# Patient Record
Sex: FEMALE | Race: WHITE | Employment: FULL TIME | URBAN - METROPOLITAN AREA
[De-identification: names, ages, dates, MRNs, and addresses within clinical notes are randomized per-mention and may not be internally consistent; named-entity substitution may affect disease eponyms.]

---

## 2017-01-19 RX ORDER — ESCITALOPRAM OXALATE 20 MG/1
TABLET ORAL
Qty: 30 TAB | Refills: 1 | Status: SHIPPED | OUTPATIENT
Start: 2017-01-19 | End: 2017-02-22 | Stop reason: SDUPTHER

## 2017-02-22 ENCOUNTER — OFFICE VISIT (OUTPATIENT)
Dept: INTERNAL MEDICINE CLINIC | Age: 62
End: 2017-02-22

## 2017-02-22 VITALS
HEART RATE: 66 BPM | DIASTOLIC BLOOD PRESSURE: 80 MMHG | SYSTOLIC BLOOD PRESSURE: 112 MMHG | OXYGEN SATURATION: 98 % | RESPIRATION RATE: 14 BRPM | HEIGHT: 70 IN | TEMPERATURE: 98.6 F

## 2017-02-22 DIAGNOSIS — Z23 NEED FOR SHINGLES VACCINE: ICD-10-CM

## 2017-02-22 DIAGNOSIS — F32.A DEPRESSION, UNSPECIFIED DEPRESSION TYPE: ICD-10-CM

## 2017-02-22 DIAGNOSIS — Z00.00 ROUTINE GENERAL MEDICAL EXAMINATION AT A HEALTH CARE FACILITY: Primary | ICD-10-CM

## 2017-02-22 DIAGNOSIS — Z11.59 NEED FOR HEPATITIS C SCREENING TEST: ICD-10-CM

## 2017-02-22 DIAGNOSIS — Z23 NEED FOR TDAP VACCINATION: ICD-10-CM

## 2017-02-22 RX ORDER — ESCITALOPRAM OXALATE 20 MG/1
TABLET ORAL
Qty: 30 TAB | Refills: 11 | Status: SHIPPED | OUTPATIENT
Start: 2017-02-22 | End: 2018-03-29 | Stop reason: SDUPTHER

## 2017-02-22 RX ORDER — ALPRAZOLAM 0.5 MG/1
0.5 TABLET ORAL
Qty: 30 TAB | Refills: 1 | Status: SHIPPED | OUTPATIENT
Start: 2017-02-22

## 2017-02-22 RX ORDER — FLUTICASONE FUROATE AND VILANTEROL 200; 25 UG/1; UG/1
1 POWDER RESPIRATORY (INHALATION) DAILY
Qty: 1 INHALER | Refills: 0 | Status: SHIPPED | COMMUNITY
Start: 2017-02-22 | End: 2017-03-22 | Stop reason: SDUPTHER

## 2017-02-22 RX ORDER — ALBUTEROL SULFATE 90 UG/1
2 AEROSOL, METERED RESPIRATORY (INHALATION)
Qty: 1 INHALER | Refills: 11 | Status: SHIPPED | OUTPATIENT
Start: 2017-02-22 | End: 2017-11-06 | Stop reason: SDUPTHER

## 2017-02-22 NOTE — PROGRESS NOTES
Chief Complaint   Patient presents with    Complete Physical     Goals that were addressed/or need to be completed after this visit:  Health Maintenance Due   Topic    Hepatitis C Screening     DTaP/Tdap/Td series (1 - Tdap)    PAP AKA CERVICAL CYTOLOGY     ZOSTER VACCINE AGE 60>     COLONOSCOPY      Faxed request to Dr. Kunal metzger office requesting patients most recent pap report to be faxed to our office. Fax confirmation received. Faxed request to Dr. Stalin Ambrose office requesting patients most recent colonoscopy to be faxed to our office. Fax confirmation received. Patient denies any symptoms, reactions or allergies that would exclude them from being immunized today. Risks and adverse reactions were discussed and the VIS was given to them. All questions were addressed. TDAP - Boostrix Injection  Left Arm  0.5 mL  Lot# [id number]  Exp: 5/20/2019  [employer]  NDC - 80803-136-56  Pt tolerated well. The patient was observed for 15 min post injection. There were no reactions observed.

## 2017-02-22 NOTE — MR AVS SNAPSHOT
Visit Information Date & Time Provider Department Dept. Phone Encounter #  
 2/22/2017  4:00 PM Whitney Padron MD [facility] [phone] [id number] Upcoming Health Maintenance Date Due Hepatitis C Screening 1955 DTaP/Tdap/Td series (1 - Tdap) 2/11/1976 PAP AKA CERVICAL CYTOLOGY 2/11/1976 ZOSTER VACCINE AGE 60> 2/11/2015 COLONOSCOPY 1/16/2016 BREAST CANCER SCRN MAMMOGRAM 4/7/2018 Allergies as of 2/22/2017  Review Complete On: 2/22/2017 By: Whitney Padron MD  
 No Known Allergies Current Immunizations  Reviewed on 2/22/2017 Name Date Influenza Vaccine 10/30/2016 Tdap  Incomplete Reviewed by Whitney Padron MD on 2/22/2017 at  4:39 PM  
 Reviewed by Whitney Padron MD on 2/22/2017 at  4:40 PM  
You Were Diagnosed With   
  
 Codes Comments Routine general medical examination at a health care facility    -  Primary ICD-10-CM: Z00.00 ICD-9-CM: V70.0 Need for shingles vaccine     ICD-10-CM: H72 ICD-9-CM: V04.89 Need for hepatitis C screening test     ICD-10-CM: Z11.59 
ICD-9-CM: V73.89 Need for Tdap vaccination     ICD-10-CM: W24 ICD-9-CM: V06.1 Depression, unspecified depression type     ICD-10-CM: F32.9 ICD-9-CM: 436 Vitals BP  
  
  
  
  
  
 [address] Preferred Pharmacy Pharmacy Name Phone [id number] [place]/ [address] [facility] [phone] Your Updated Medication List  
  
   
This list is accurate as of: 2/22/17  4:55 PM.  Always use your most recent med list.  
  
  
  
  
 albuterol 90 mcg/actuation inhaler Commonly known as:  PROAIR HFA Take 2 Puffs by inhalation every six (6) hours as needed for Wheezing. Indications: Acute Asthma Attack ALPRAZolam 0.5 mg tablet Commonly known as:  Marti Josephine Take 1 Tab by mouth three (3) times daily as needed for Sleep or Anxiety. Indications: ANXIETY aspirin delayed-release 81 mg tablet Take  by mouth daily. cyclobenzaprine 10 mg tablet Commonly known as:  FLEXERIL Take 0.5-1 Tabs by mouth three (3) times daily as needed for Muscle Spasm(s). ergocalciferol 50,000 unit capsule Commonly known as:  ERGOCALCIFEROL Use weekly times one month then every 2 weeks for 6 months.  
  
 escitalopram oxalate 20 mg tablet Commonly known as:  Pollyann Essex TAKE ONE TABLET BY MOUTH EVERY DAY LAB WORK REQUIRED FOR ADDITIONAL REFILLS  Indications: ANXIETY WITH DEPRESSION  
  
 fexofenadine 180 mg tablet Commonly known as:  Melanie Peat Take 1 Tab by mouth daily. fluticasone-vilanterol 200-25 mcg/dose inhaler Commonly known as:  BREO ELLIPTA Take 1 Puff by inhalation daily. folic acid 1 mg tablet Commonly known as:  Google Take 4 mg by mouth daily. furosemide 40 mg tablet Commonly known as:  LASIX Take 1 Tab by mouth daily as needed. gabapentin 100 mg capsule Commonly known as:  NEURONTIN  
TAKE ONE (1) CAPSULE(S) THREE TIMESDAILY AS NEEDED. Iron 325 mg (65 mg iron) tablet Generic drug:  ferrous sulfate Take 1 Tab by mouth Daily (before breakfast). leucovorin calcium 5 mg tablet Commonly known as:  Samreen Nares Take  by mouth every seven (7) days. levothyroxine 125 mcg tablet Commonly known as:  SYNTHROID  
TAKE ONE TABLET BY MOUTH EVERY DAY BEFORE BREAKFAST - LAB WORK & FOLLOW UP APPOINTMENT REQUIRED FOR ADDITIONAL REFILLS  
  
 methotrexate 2.5 mg tablet Commonly known as:  Samantha Bye Take 8 Tabs by mouth every Sunday. PROBIOTIC COLON SUPPORT 1.5 billion cell Cap Generic drug:  L. gasseri-B. bifidum-B longum Take 1 Cap by mouth daily. REMICADE 100 mg injection Generic drug:  inFLIXimab  
5 mg/kg by IntraVENous route once. [id number]  
  
 traMADol 50 mg tablet Commonly known as:  ULTRAM  
50 mg nightly as needed. Prescriptions Printed Refills ALPRAZolam (XANAX) 0.5 mg tablet 1 Sig: Take 1 Tab by mouth three (3) times daily as needed for Sleep or Anxiety. Indications: ANXIETY Class: Print Route: Oral  
  
Prescriptions Sent to Pharmacy Refills  
 escitalopram oxalate (LEXAPRO) 20 mg tablet 11 Sig: TAKE ONE TABLET BY MOUTH EVERY DAY LAB WORK REQUIRED FOR ADDITIONAL REFILLS  Indications: ANXIETY WITH DEPRESSION Class: Normal  
 [facility] Ph #: [phone]  
 albuterol (PROAIR HFA) 90 mcg/actuation inhaler 11 Sig: Take 2 Puffs by inhalation every six (6) hours as needed for Wheezing. Indications: Acute Asthma Attack Class: Normal  
 Pharmacy: [id number] [address] Ph #: [phone] Route: Inhalation We Performed the Following HEPATITIS C AB [80538 CPT(R)] LIPID PANEL [43225 CPT(R)] SC IMMUNIZ ADMIN,1 SINGLE/COMB VAC/TOXOID [id number] CPT(R)] T4, FREE N853100 CPT(R)] TETANUS, DIPHTHERIA TOXOIDS AND ACELLULAR PERTUSSIS VACCINE (TDAP), IN INDIVIDS. >=7, IM [id number] CPT(R)] TSH 3RD GENERATION [31588 CPT(R)] UA/M W/RFLX CULTURE, ROUTINE [[id number] Custom] Introducing [facility]! Dear Yamile Harvey: Thank you for requesting a [employer] account. Our records indicate that you already have an active [employer] account. You can access your account anytime at [email or web address] Did you know that you can access your hospital and ER discharge instructions at any time in [employer]? You can also review all of your test results from your hospital stay or ER visit. Additional Information If you have questions, please visit the Frequently Asked Questions section of the [employer] website at [email or web address] Remember, [employer] is NOT to be used for urgent needs. For medical emergencies, dial 911. Now available from your iPhone and Android! Please provide this summary of care documentation to your next provider. Your primary care clinician is listed as [phone] If you have any questions after today's visit, please call [phone].

## 2017-02-23 NOTE — PROGRESS NOTES
Subjective:   58 y.o. female for Well Woman Check. Her gyne and breast care is done elsewhere by her Ob-Gyne physician. Patient Active Problem List    Diagnosis Date Noted    Depression 02/22/2017    Asthma     Left atrial enlargement     Meniere's disease     Pulmonary embolism (HCC)     RA (rheumatoid arthritis) (HCC)     Thyroid disease     Vitamin D deficiency     Anxiety state 06/23/2010     Current Outpatient Prescriptions   Medication Sig Dispense Refill    escitalopram oxalate (LEXAPRO) 20 mg tablet TAKE ONE TABLET BY MOUTH EVERY DAY LAB WORK REQUIRED FOR ADDITIONAL REFILLS  Indications: ANXIETY WITH DEPRESSION 30 Tab 11    ALPRAZolam (XANAX) 0.5 mg tablet Take 1 Tab by mouth three (3) times daily as needed for Sleep or Anxiety. Indications: ANXIETY 30 Tab 1    albuterol (PROAIR HFA) 90 mcg/actuation inhaler Take 2 Puffs by inhalation every six (6) hours as needed for Wheezing. Indications: Acute Asthma Attack 1 Inhaler 11    fluticasone-vilanterol (BREO ELLIPTA) 200-25 mcg/dose inhaler Take 1 Puff by inhalation daily. 1 Inhaler 0    furosemide (LASIX) 40 mg tablet Take 1 Tab by mouth daily as needed. (Patient taking differently: Take 40 mg by mouth daily.) 90 Tab 0    levothyroxine (SYNTHROID) 125 mcg tablet TAKE ONE TABLET BY MOUTH EVERY DAY BEFORE BREAKFAST - LAB WORK & FOLLOW UP APPOINTMENT REQUIRED FOR ADDITIONAL REFILLS 90 Tab 1    traMADol (ULTRAM) 50 mg tablet 50 mg nightly as needed.  aspirin delayed-release 81 mg tablet Take  by mouth daily.  leucovorin calcium (WELLCOVORIN) 5 mg tablet Take  by mouth every seven (7) days.  fexofenadine (ALLEGRA) 180 mg tablet Take 1 Tab by mouth daily.  methotrexate (RHEUMATREX) 2.5 mg tablet Take 8 Tabs by mouth every Sunday.  infliximab (REMICADE) 100 mg injection 5 mg/kg by IntraVENous route once. [id number]      folic acid (FOLVITE) 1 mg tablet Take 4 mg by mouth daily.       gabapentin (NEURONTIN) 100 mg capsule TAKE ONE (1) CAPSULE(S) THREE TIMESDAILY AS NEEDED. 60 Cap 3    cyclobenzaprine (FLEXERIL) 10 mg tablet Take 0.5-1 Tabs by mouth three (3) times daily as needed for Muscle Spasm(s). 30 Tab 0    Lacto gasseri-B bifid-B longum (PROBIOTIC COLON SUPPORT) 1.5 billion cell cap Take 1 Cap by mouth daily.  ergocalciferol (ERGOCALCIFEROL) 50,000 unit capsule Use weekly times one month then every 2 weeks for 6 months. 14 capsule 1    ferrous sulfate (IRON) 325 mg (65 mg iron) tablet Take 1 Tab by mouth Daily (before breakfast). No Known Allergies  Past Medical History:   Diagnosis Date    Anxiety state, unspecified 6/23/2010    Asthma     Left atrial enlargement     RIGHT ATR. ENL.     Meniere's disease     Pulmonary embolism ([facility] [address] '98    MULTIPLE    RA (rheumatoid arthritis) ([facility] [address]     Thyroid disease     Unspecified asthma(493.90) 6/23/2010    Vitamin D deficiency      Past Surgical History:   Procedure Laterality Date    ABDOMEN SURGERY PROC UNLISTED      ENDOMETRIAL CRYOABLATION  1996    GASTRIC BYPASS,OBESE<150CM TONIA-EN-Y      HX BREAST LUMPECTOMY  '98    BENIGN    HX CERVICAL LAMINECTOMY  07/02/2015    C5-C6    HX KNEE ARTHROSCOPY      HX TONSILLECTOMY      REMOVAL OF COCCYX       Family History   Problem Relation Age of Onset    Diabetes Mother     Stroke Mother      TIA    Heart Disease Father     Diabetes Father      Social History   Substance Use Topics    Smoking status: Never Smoker    Smokeless tobacco: Never Used    Alcohol use No        Lab Results  Component Value Date/Time   Cholesterol, total 169 12/26/2014 11:04 AM   HDL Cholesterol 71 12/26/2014 11:04 AM   LDL, calculated 89 12/26/2014 11:04 AM   Triglyceride 47 12/26/2014 11:04 AM       Lab Results  Component Value Date/Time   TSH 3.640 12/26/2014 11:04 AM   T4, Free 0.92 12/26/2014 11:04 AM      Lab Results   Component Value Date/Time    Glucose 100 12/08/2011 11:05 AM         Specific concerns today: URi symptoms with dry cough and wheeze for one week. No fever or chills. NO fever or chills. No PND or orthopnea. No change in bowels or bladder. Is seeing the rheum MD regularly and labs done every 3 months . Arthritis is well controlled. .    Review of Systems  A comprehensive review of systems was negative except for that written in the HPI. Objective:   Blood pressure 112/80, pulse 66, temperature 98.6 °F (37 °C), temperature source Oral, resp. rate 14, height 5' 9.5\" (1.765 m), SpO2 98 %.    Physical Examination:   General appearance - alert, well appearing, and in no distress  Eyes - pupils equal and reactive, extraocular eye movements intact  Ears - bilateral TM's and external ear canals normal  Nose - normal and patent, no erythema, discharge or polyps  Mouth - mucous membranes moist, pharynx normal without lesions  Neck - supple, no significant adenopathy  Lymphatics - no palpable lymphadenopathy, no hepatosplenomegaly  Chest - clear to auscultation, no wheezes, rales or rhonchi, symmetric air entry  Heart - normal rate, regular rhythm, normal S1, S2, no murmurs, rubs, clicks or gallops  Abdomen - soft, nontender, nondistended, no masses or organomegaly  Back exam - full range of motion, no tenderness, palpable spasm or pain on motion  Neurological - alert, oriented, normal speech, no focal findings or movement disorder noted  Musculoskeletal - no joint tenderness, deformity or swelling  Extremities - peripheral pulses normal, no pedal edema, no clubbing or cyanosis     Assessment/Plan:     lose weight, increase physical activity, routine labs ordered  Harrison Rodriges was seen today for complete physical.    Diagnoses and all orders for this visit:    Routine general medical examination at a health care facility  -     LIPID PANEL  -     HEPATITIS C AB  -     T4, FREE  -     TSH 3RD GENERATION  -     UA/M W/RFLX CULTURE, ROUTINE    Need for shingles vaccine    Need for hepatitis C screening test    Need for Tdap vaccination  -     TETANUS, DIPHTHERIA TOXOIDS AND ACELLULAR PERTUSSIS VACCINE (TDAP), IN INDIVIDS. >=7, IM  -     SD IMMUNIZ ADMIN,1 SINGLE/COMB VAC/TOXOID    Depression, unspecified depression type - controlled on current meds. Other orders    -     escitalopram oxalate (LEXAPRO) 20 mg tablet; TAKE ONE TABLET BY MOUTH EVERY DAY LAB WORK REQUIRED FOR ADDITIONAL REFILLS  Indications: ANXIETY WITH DEPRESSION  -     ALPRAZolam (XANAX) 0.5 mg tablet; Take 1 Tab by mouth three (3) times daily as needed for Sleep or Anxiety. Indications: ANXIETY  -     albuterol (PROAIR HFA) 90 mcg/actuation inhaler; Take 2 Puffs by inhalation every six (6) hours as needed for Wheezing. Indications: Acute Asthma Attack  -     fluticasone-vilanterol (BREO ELLIPTA) 200-25 mcg/dose inhaler; Take 1 Puff by inhalation daily. Mild asthma flair - will try inhaled steroids and she will call if not better    RA - controlled and seeing the rheum MD regularly. Follow-up Disposition: 12 months and as needed. Advised her to call back or return to office if symptoms worsen/change/persist.  Discussed expected course/resolution/complications of diagnosis in detail with patient. Medication risks/benefits/costs/interactions/alternatives discussed with patient. She was given an after visit summary which includes diagnoses, current medications, & vitals. She expressed understanding with the diagnosis and plan.

## 2017-03-03 RX ORDER — PREDNISONE 10 MG/1
TABLET ORAL
Qty: 30 TAB | Refills: 0 | Status: SHIPPED | OUTPATIENT
Start: 2017-03-03 | End: 2017-03-20 | Stop reason: ALTCHOICE

## 2017-03-07 LAB
CHOLEST SERPL-MCNC: 213 MG/DL (ref 100–199)
GLUCOSE UR QL: NORMAL
HCV AB S/CO SERPL IA: <0.1 S/CO RATIO (ref 0–0.9)
HDLC SERPL-MCNC: 93 MG/DL
KETONES UR QL STRIP: NORMAL
LDLC SERPL CALC-MCNC: 111 MG/DL (ref 0–99)
PH UR STRIP: NORMAL [PH]
PROT UR QL STRIP: NORMAL
SP GR UR: NORMAL
T4 FREE SERPL-MCNC: 0.95 NG/DL (ref 0.82–1.77)
TRIGL SERPL-MCNC: 45 MG/DL (ref 0–149)
TSH SERPL DL<=0.005 MIU/L-ACNC: 3.35 UIU/ML (ref 0.45–4.5)
VLDLC SERPL CALC-MCNC: 9 MG/DL (ref 5–40)

## 2017-03-20 ENCOUNTER — HOSPITAL ENCOUNTER (OUTPATIENT)
Dept: CT IMAGING | Age: 62
Discharge: HOME | End: 2017-03-20
Attending: INTERNAL MEDICINE
Payer: COMMERCIAL

## 2017-03-20 ENCOUNTER — OFFICE VISIT (OUTPATIENT)
Dept: INTERNAL MEDICINE CLINIC | Age: 62
End: 2017-03-20

## 2017-03-20 ENCOUNTER — TELEPHONE (OUTPATIENT)
Dept: INTERNAL MEDICINE CLINIC | Age: 62
End: 2017-03-20

## 2017-03-20 VITALS
SYSTOLIC BLOOD PRESSURE: 100 MMHG | RESPIRATION RATE: 20 BRPM | TEMPERATURE: 98.2 F | HEART RATE: 68 BPM | OXYGEN SATURATION: 98 % | DIASTOLIC BLOOD PRESSURE: 70 MMHG

## 2017-03-20 DIAGNOSIS — Z86.711 HX PULMONARY EMBOLISM: ICD-10-CM

## 2017-03-20 DIAGNOSIS — R06.02 SOB (SHORTNESS OF BREATH): ICD-10-CM

## 2017-03-20 DIAGNOSIS — R05.9 COUGH: ICD-10-CM

## 2017-03-20 DIAGNOSIS — R06.02 SOB (SHORTNESS OF BREATH): Primary | ICD-10-CM

## 2017-03-20 PROCEDURE — 71275 CT ANGIOGRAPHY CHEST: CPT

## 2017-03-20 PROCEDURE — 74011636320 HC RX REV CODE- 636/320: Performed by: INTERNAL MEDICINE

## 2017-03-20 RX ORDER — PREDNISONE 10 MG/1
10 TABLET ORAL
Qty: 30 TAB | Refills: 0 | Status: SHIPPED | OUTPATIENT
Start: 2017-03-20 | End: 2017-11-06 | Stop reason: ALTCHOICE

## 2017-03-20 RX ORDER — AMOXICILLIN AND CLAVULANATE POTASSIUM 875; 125 MG/1; MG/1
1 TABLET, FILM COATED ORAL EVERY 12 HOURS
Qty: 20 TAB | Refills: 0 | Status: SHIPPED | OUTPATIENT
Start: 2017-03-20 | End: 2017-03-30

## 2017-03-20 RX ADMIN — IOPAMIDOL 100 ML: 755 INJECTION, SOLUTION INTRAVENOUS at 11:49

## 2017-03-20 NOTE — PROGRESS NOTES
Subjective:   Manan Carranza is a 58 y.o. female who complains of congestion, dry cough. Wheeze and SOB as well and left sinus pain for 14 days, unchanged since that time. She denies a history of fevers, myalgias, nausea and vomiting. Evaluation to date: seen and treated with steroids and antibiotics. .   Treatment to date: as above. Patient does smoke cigarettes. Relevant PMH: Asthma. Patient Active Problem List    Diagnosis Date Noted    Depression 02/22/2017    Asthma     Left atrial enlargement     Meniere's disease     Pulmonary embolism (HCC)     RA (rheumatoid arthritis) (HCC)     Thyroid disease     Vitamin D deficiency     Anxiety state 06/23/2010     Current Outpatient Prescriptions   Medication Sig Dispense Refill    escitalopram oxalate (LEXAPRO) 20 mg tablet TAKE ONE TABLET BY MOUTH EVERY DAY LAB WORK REQUIRED FOR ADDITIONAL REFILLS  Indications: ANXIETY WITH DEPRESSION 30 Tab 11    ALPRAZolam (XANAX) 0.5 mg tablet Take 1 Tab by mouth three (3) times daily as needed for Sleep or Anxiety. Indications: ANXIETY 30 Tab 1    albuterol (PROAIR HFA) 90 mcg/actuation inhaler Take 2 Puffs by inhalation every six (6) hours as needed for Wheezing. Indications: Acute Asthma Attack 1 Inhaler 11    fluticasone-vilanterol (BREO ELLIPTA) 200-25 mcg/dose inhaler Take 1 Puff by inhalation daily. 1 Inhaler 0    furosemide (LASIX) 40 mg tablet Take 1 Tab by mouth daily as needed. (Patient taking differently: Take 40 mg by mouth daily.) 90 Tab 0    levothyroxine (SYNTHROID) 125 mcg tablet TAKE ONE TABLET BY MOUTH EVERY DAY BEFORE BREAKFAST - LAB WORK & FOLLOW UP APPOINTMENT REQUIRED FOR ADDITIONAL REFILLS 90 Tab 1    gabapentin (NEURONTIN) 100 mg capsule TAKE ONE (1) CAPSULE(S) THREE TIMESDAILY AS NEEDED. 60 Cap 3    cyclobenzaprine (FLEXERIL) 10 mg tablet Take 0.5-1 Tabs by mouth three (3) times daily as needed for Muscle Spasm(s).  30 Tab 0    traMADol (ULTRAM) 50 mg tablet 50 mg nightly as needed.  aspirin delayed-release 81 mg tablet Take  by mouth daily.  leucovorin calcium (WELLCOVORIN) 5 mg tablet Take  by mouth every seven (7) days.  fexofenadine (ALLEGRA) 180 mg tablet Take 1 Tab by mouth daily.  ferrous sulfate (IRON) 325 mg (65 mg iron) tablet Take 1 Tab by mouth Daily (before breakfast).  methotrexate (RHEUMATREX) 2.5 mg tablet Take 8 Tabs by mouth every Sunday.  infliximab (REMICADE) 100 mg injection 5 mg/kg by IntraVENous route once. [id number]      folic acid (FOLVITE) 1 mg tablet Take 4 mg by mouth daily.  Lacto gasseri-B bifid-B longum (PROBIOTIC COLON SUPPORT) 1.5 billion cell cap Take 1 Cap by mouth daily. No Known Allergies  Past Medical History:   Diagnosis Date    Anxiety state, unspecified 6/23/2010    Asthma     Left atrial enlargement     RIGHT ATR. ENL.  Meniere's disease     Pulmonary embolism ([facility] [address] '98    MULTIPLE    RA (rheumatoid arthritis) ([facility] [address]     Thyroid disease     Unspecified asthma(493.90) 6/23/2010    Vitamin D deficiency      Past Surgical History:   Procedure Laterality Date    ABDOMEN SURGERY PROC UNLISTED      ENDOMETRIAL CRYOABLATION  1996    GASTRIC BYPASS,OBESE<150CM TONIA-EN-Y      HX BREAST LUMPECTOMY  '98    BENIGN    HX CERVICAL LAMINECTOMY  07/02/2015    C5-C6    HX COLONOSCOPY  05/06/2016    Dr. Kiet James - 8 yr f/u    HX KNEE ARTHROSCOPY      HX TONSILLECTOMY      REMOVAL OF COCCYX       Family History   Problem Relation Age of Onset    Diabetes Mother     Stroke Mother      TIA    Heart Disease Father     Diabetes Father      Social History   Substance Use Topics    Smoking status: Never Smoker    Smokeless tobacco: Never Used    Alcohol use No        Review of Systems  Pertinent items are noted in HPI.     Objective:     Visit Vitals    /70 (BP 1 Location: Right arm, BP Patient Position: Sitting)    Pulse 68    Temp 98.2 °F (36.8 °C) (Oral)    Resp 20    SpO2 98%     General: alert, cooperative, no distress   Eyes: negative   Ears: normal TM's and external ear canals AU   Sinuses: tenderness over left maxillary   Mouth:  Lips, mucosa, and tongue normal. Teeth and gums normal   Neck: supple, symmetrical, trachea midline and no adenopathy. Heart: S1 and S2 normal, no murmurs noted. Lungs: rhonchi R base, L base   Abdomen: soft, non-tender. Bowel sounds normal. No masses,  no organomegaly        Assessment/Plan:   ? Pneumonia versus recurrent PE with sinusitis and asthma exacerbation  Plan -   Plan -   Orders Placed This Encounter    CTA CHEST W WO CONT   Oonsider antibiotics and more steroids if negative.

## 2017-03-20 NOTE — TELEPHONE ENCOUNTER
Spoke with pt in ref to CTA results. No new clots per [facility]. To start on Pred taper and Augmentin 875 mg BID x 10 days. Red flags reviewed. Pt verbalized understanding. Orders Placed This Encounter    predniSONE (DELTASONE) 10 mg tablet     Sig: Take 1 Tab by mouth daily (with breakfast). 40 mg x 3 days, 30 mg x 3 days, 20 mg x 3 days, 10 mg x 3 days     Dispense:  30 Tab     Refill:  0    amoxicillin-clavulanate (AUGMENTIN) 875-125 mg per tablet     Sig: Take 1 Tab by mouth every twelve (12) hours for 10 days. Dispense:  20 Tab     Refill:  0     The above medication refills were approved via verbal order by Dr. Khadra Segura III.

## 2017-03-20 NOTE — TELEPHONE ENCOUNTER
----- Message from Nora Hill MD sent at 3/20/2017  1:15 PM EDT -----  Notify no new clots. Augmentin 875 MG BID for 10 days and prednisone taper.

## 2017-03-22 RX ORDER — FLUTICASONE FUROATE AND VILANTEROL 200; 25 UG/1; UG/1
1 POWDER RESPIRATORY (INHALATION) DAILY
Qty: 1 INHALER | Refills: 0 | Status: SHIPPED | COMMUNITY
Start: 2017-03-22 | End: 2017-11-06 | Stop reason: ALTCHOICE

## 2017-03-30 RX ORDER — FUROSEMIDE 40 MG/1
40 TABLET ORAL DAILY
Qty: 90 TAB | Refills: 0 | Status: SHIPPED | OUTPATIENT
Start: 2017-03-30 | End: 2017-09-19 | Stop reason: SDUPTHER

## 2017-03-30 NOTE — TELEPHONE ENCOUNTER
Orders Placed This Encounter    furosemide (LASIX) 40 mg tablet     Sig: Take 1 Tab by mouth daily. Dispense:  90 Tab     Refill:  0     The above medication refills were approved via verbal order by Dr. Jm Hernandez III.

## 2017-04-11 RX ORDER — LEVOTHYROXINE SODIUM 125 UG/1
TABLET ORAL
Qty: 90 TAB | Refills: 1 | Status: SHIPPED | OUTPATIENT
Start: 2017-04-11 | End: 2018-05-21 | Stop reason: SDUPTHER

## 2017-09-06 LAB — CREATININE, EXTERNAL: 0.7

## 2017-09-20 RX ORDER — FUROSEMIDE 40 MG/1
40 TABLET ORAL DAILY
Qty: 90 TAB | Refills: 1 | Status: SHIPPED | OUTPATIENT
Start: 2017-09-20 | End: 2018-09-25 | Stop reason: SDUPTHER

## 2017-09-20 NOTE — TELEPHONE ENCOUNTER
Orders Placed This Encounter    furosemide (LASIX) 40 mg tablet     Sig: Take 1 Tab by mouth daily. Dispense:  90 Tab     Refill:  1     The above medication refills were approved via verbal order by Dr. Delia Lu III.

## 2017-09-22 ENCOUNTER — OFFICE VISIT (OUTPATIENT)
Dept: FAMILY MEDICINE CLINIC | Age: 62
End: 2017-09-22

## 2017-09-22 VITALS
OXYGEN SATURATION: 98 % | DIASTOLIC BLOOD PRESSURE: 62 MMHG | RESPIRATION RATE: 16 BRPM | BODY MASS INDEX: 40.8 KG/M2 | HEART RATE: 72 BPM | TEMPERATURE: 98.7 F | HEIGHT: 70 IN | SYSTOLIC BLOOD PRESSURE: 112 MMHG | WEIGHT: 285 LBS

## 2017-09-22 DIAGNOSIS — Q83.9 BREAST ANOMALY: ICD-10-CM

## 2017-09-22 DIAGNOSIS — N64.4 BREAST PAIN: Primary | ICD-10-CM

## 2017-09-22 NOTE — PROGRESS NOTES
Chief Complaint   Patient presents with    Breast Problem     Noted vibration in left breast starting this week.  No pain, discharge, or swelling noted

## 2017-09-29 NOTE — PROGRESS NOTES
HISTORY OF PRESENT ILLNESS  Sam Longo is a 58 y.o. female. HPI   This 58year old presents with a \"tingling \" sensation in her left breast. She states it feels like occasional very sporadic electric shocks. On further history, she states the sensation is actually inside of her chest.again it is very occasional, lasts a  Few seconds and resolves spontaneously. No diaphoresis, no sob, no radiation, no n or v    Review of Systems   Constitutional: Negative for fever. HENT: Negative for ear discharge and sore throat. Respiratory: Negative for shortness of breath. Cardiovascular: Negative for chest pain and palpitations (?). Gastrointestinal: Negative for abdominal pain, nausea and vomiting. Musculoskeletal: Negative for myalgias. Neurological: Positive for tingling. Negative for dizziness and headaches. Physical Exam   Constitutional: She is oriented to person, place, and time. She appears well-developed and well-nourished. No distress. HENT:   Right Ear: External ear normal.   Left Ear: External ear normal.   Nose: Nose normal.   Mouth/Throat: Oropharynx is clear and moist. No oropharyngeal exudate. Neck: Normal range of motion. Neck supple. Cardiovascular: Normal rate, regular rhythm and normal heart sounds. No murmur heard. Pulmonary/Chest: Effort normal and breath sounds normal. No respiratory distress. She has no wheezes. She has no rales. Abdominal: Soft. There is no tenderness. Neurological: She is alert and oriented to person, place, and time. She has normal reflexes. Skin: Skin is warm. She is not diaphoretic. ASSESSMENT and PLAN    ICD-10-CM ICD-9-CM    1. Breast pain N64.4 611.71    2. 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   Pt however says she is going to go home and rest and then see how she \"feels\"  She fully understands the full implications of her decision  Precautions given

## 2017-11-06 ENCOUNTER — OFFICE VISIT (OUTPATIENT)
Dept: INTERNAL MEDICINE CLINIC | Age: 62
End: 2017-11-06

## 2017-11-06 VITALS
DIASTOLIC BLOOD PRESSURE: 76 MMHG | HEART RATE: 66 BPM | SYSTOLIC BLOOD PRESSURE: 130 MMHG | HEIGHT: 70 IN | RESPIRATION RATE: 16 BRPM | OXYGEN SATURATION: 97 % | TEMPERATURE: 98.6 F

## 2017-11-06 DIAGNOSIS — J02.0 STREP PHARYNGITIS: ICD-10-CM

## 2017-11-06 DIAGNOSIS — J45.20 MILD INTERMITTENT ASTHMA, UNSPECIFIED WHETHER COMPLICATED: ICD-10-CM

## 2017-11-06 DIAGNOSIS — J02.9 SORE THROAT: Primary | ICD-10-CM

## 2017-11-06 LAB
S PYO AG THROAT QL: POSITIVE
VALID INTERNAL CONTROL?: YES

## 2017-11-06 RX ORDER — AMOXICILLIN AND CLAVULANATE POTASSIUM 875; 125 MG/1; MG/1
1 TABLET, FILM COATED ORAL EVERY 12 HOURS
Qty: 20 TAB | Refills: 0 | Status: SHIPPED | OUTPATIENT
Start: 2017-11-06 | End: 2018-02-28 | Stop reason: ALTCHOICE

## 2017-11-06 RX ORDER — ALBUTEROL SULFATE 90 UG/1
2 AEROSOL, METERED RESPIRATORY (INHALATION)
Qty: 1 INHALER | Refills: 11 | Status: SHIPPED | OUTPATIENT
Start: 2017-11-06 | End: 2019-03-28 | Stop reason: SDUPTHER

## 2017-11-06 NOTE — PROGRESS NOTES
Subjective:   Estella Ghotra is a 58 y.o. female who complains of congestion, sore throat, dry cough and mild wheezing at rest for 3 days, gradually worsening since that time. She denies a history of fevers, myalgias, nausea and vomiting. Evaluation to date: none. Treatment to date: cough suppressants, OTC products. Patient does not smoke cigarettes. Relevant PMH: Asthma. Patient Active Problem List    Diagnosis Date Noted    Depression 02/22/2017    Asthma     Left atrial enlargement     Meniere's disease     Pulmonary embolism (HCC)     RA (rheumatoid arthritis) (HCC)     Thyroid disease     Vitamin D deficiency     Anxiety state 06/23/2010     Current Outpatient Prescriptions   Medication Sig Dispense Refill    VIT C/E/ZN/COPPR/LUTEIN/ZEAXAN (PRESERVISION AREDS 2 PO) Take  by mouth.  amoxicillin-clavulanate (AUGMENTIN) 875-125 mg per tablet Take 1 Tab by mouth every twelve (12) hours. 20 Tab 0    albuterol (PROAIR HFA) 90 mcg/actuation inhaler Take 2 Puffs by inhalation every six (6) hours as needed for Wheezing. Indications: Acute Asthma Attack 1 Inhaler 11    furosemide (LASIX) 40 mg tablet Take 1 Tab by mouth daily. 90 Tab 1    levothyroxine (SYNTHROID) 125 mcg tablet TAKE ONE TABLET BY MOUTH EVERY DAY BEFORE BREAKFAST - LAB WORK &FOLLOW UP APPOINTMENT 90 Tab 1    escitalopram oxalate (LEXAPRO) 20 mg tablet TAKE ONE TABLET BY MOUTH EVERY DAY LAB WORK REQUIRED FOR ADDITIONAL REFILLS  Indications: ANXIETY WITH DEPRESSION 30 Tab 11    ALPRAZolam (XANAX) 0.5 mg tablet Take 1 Tab by mouth three (3) times daily as needed for Sleep or Anxiety. Indications: ANXIETY 30 Tab 1    gabapentin (NEURONTIN) 100 mg capsule TAKE ONE (1) CAPSULE(S) THREE TIMESDAILY AS NEEDED. 60 Cap 3    cyclobenzaprine (FLEXERIL) 10 mg tablet Take 0.5-1 Tabs by mouth three (3) times daily as needed for Muscle Spasm(s). 30 Tab 0    traMADol (ULTRAM) 50 mg tablet 50 mg nightly as needed.       aspirin delayed-release 81 mg tablet Take  by mouth daily.  leucovorin calcium (WELLCOVORIN) 5 mg tablet Take  by mouth every seven (7) days.  fexofenadine (ALLEGRA) 180 mg tablet Take 1 Tab by mouth daily.  methotrexate (RHEUMATREX) 2.5 mg tablet Take 8 Tabs by mouth every Sunday.  infliximab (REMICADE) 100 mg injection 5 mg/kg by IntraVENous route once. [id number]      folic acid (FOLVITE) 1 mg tablet Take 4 mg by mouth daily. No Known Allergies  Past Medical History:   Diagnosis Date    Anxiety state, unspecified 6/23/2010    Asthma     Left atrial enlargement     RIGHT ATR. ENL.  Meniere's disease     Pulmonary embolism ([facility] [address] '98    MULTIPLE    RA (rheumatoid arthritis) ([facility] [address] [facility]     Thyroid disease     Unspecified asthma(493.90) 6/23/2010    Vitamin D deficiency      Past Surgical History:   Procedure Laterality Date    ABDOMEN SURGERY PROC UNLISTED      ENDOMETRIAL CRYOABLATION  1996    GASTRIC BYPASS,OBESE<150CM TONIA-EN-Y      HX BREAST LUMPECTOMY  '98    BENIGN    HX CERVICAL LAMINECTOMY  07/02/2015    C5-C6    HX COLONOSCOPY  05/06/2016    Dr. Calhoun Mode - 8 yr f/u    HX KNEE ARTHROSCOPY      HX TONSILLECTOMY      REMOVAL OF COCCYX       Family History   Problem Relation Age of Onset    Diabetes Mother     Stroke Mother      TIA    Heart Disease Father     Diabetes Father      Social History   Substance Use Topics    Smoking status: Never Smoker    Smokeless tobacco: Never Used    Alcohol use No        Review of Systems  Pertinent items are noted in HPI.     Objective:     Visit Vitals    /76    Pulse 66    Temp 98.6 °F (37 °C) (Oral)    Resp 16    Ht 5' 9.5\" (1.765 m)    SpO2 97%     General:  alert, cooperative, no distress   Eyes: negative   Ears: normal TM's and external ear canals AU   Sinuses: Normal paranasal sinuses without tenderness   Mouth:  Lips, mucosa, and tongue normal. Teeth and gums normal and abnormal findings: moderate oropharyngeal erythema Neck: supple, symmetrical, trachea midline and no adenopathy. Heart: S1 and S2 normal, no murmurs noted. Lungs: clear to auscultation bilaterally, rare exp wheeze   Abdomen: soft, non-tender. Bowel sounds normal. No masses,  no organomegaly        Assessment/Plan:   strep pharyngitis and bronchospasm  Plan -   Orders Placed This Encounter    AMB POC RAPID STREP A    VIT C/E/ZN/COPPR/LUTEIN/ZEAXAN (PRESERVISION AREDS 2 PO)     Sig: Take  by mouth.  amoxicillin-clavulanate (AUGMENTIN) 875-125 mg per tablet     Sig: Take 1 Tab by mouth every twelve (12) hours. Dispense:  20 Tab     Refill:  0    albuterol (PROAIR HFA) 90 mcg/actuation inhaler     Sig: Take 2 Puffs by inhalation every six (6) hours as needed for Wheezing. Indications: Acute Asthma Attack     Dispense:  1 Inhaler     Refill:  11     Advised her to call back or return to office if symptoms worsen/change/persist.  Discussed expected course/resolution/complications of diagnosis in detail with patient. Medication risks/benefits/costs/interactions/alternatives discussed with patient. She was given an after visit summary which includes diagnoses, current medications, & vitals. She expressed understanding with the diagnosis and plan. Fidencio Carnes

## 2017-11-06 NOTE — MR AVS SNAPSHOT
Visit Information Date & Time Provider Department Dept. Phone Encounter #  
 11/6/2017  8:45 AM Zbigniew Evans MD [address] [address] Internal Medicine [phone] [id number] Upcoming Health Maintenance Date Due Pneumococcal 19-64 Medium Risk (1 of 1 - PPSV23) 2/11/1974 ZOSTER VACCINE AGE 60> 12/11/2014 BREAST CANCER SCRN MAMMOGRAM 4/7/2018 PAP AKA CERVICAL CYTOLOGY 4/7/2019 COLONOSCOPY 5/6/2026 DTaP/Tdap/Td series (2 - Td) 2/22/2027 Allergies as of 11/6/2017  Review Complete On: 11/6/2017 By: Zbigniew Evans MD  
 No Known Allergies Current Immunizations  Reviewed on 2/22/2017 Name Date Influenza Vaccine 9/30/2017, 10/30/2016 Tdap 2/22/2017 Not reviewed this visit You Were Diagnosed With   
  
 Codes Comments Sore throat    -  Primary ICD-10-CM: J02.9 ICD-9-CM: 711 Strep pharyngitis     ICD-10-CM: J02.0 ICD-9-CM: 034.0 Mild intermittent asthma, unspecified whether complicated     XOS-91-GX: J45.20 ICD-9-CM: 493.90 Vitals BP Pulse Temp Resp Height(growth percentile) SpO2  
 130/76 66 98.6 °F (37 °C) (Oral) 16 5' 9.5\" (1.765 m) 97% OB Status Smoking Status Postmenopausal Never Smoker Vitals History Preferred Pharmacy Pharmacy Name Phone [id number] [facility] [phone] Your Updated Medication List  
  
   
This list is accurate as of: 11/6/17  9:14 AM.  Always use your most recent med list.  
  
  
  
  
 albuterol 90 mcg/actuation inhaler Commonly known as:  PROAIR HFA Take 2 Puffs by inhalation every six (6) hours as needed for Wheezing. Indications: Acute Asthma Attack ALPRAZolam 0.5 mg tablet Commonly known as:  Janes Sequin Take 1 Tab by mouth three (3) times daily as needed for Sleep or Anxiety. Indications: ANXIETY  
  
 amoxicillin-clavulanate 875-125 mg per tablet Commonly known as:  AUGMENTIN  
 Take 1 Tab by mouth every twelve (12) hours. aspirin delayed-release 81 mg tablet Take  by mouth daily. cyclobenzaprine 10 mg tablet Commonly known as:  FLEXERIL Take 0.5-1 Tabs by mouth three (3) times daily as needed for Muscle Spasm(s). escitalopram oxalate 20 mg tablet Commonly known as:  Delcambre Alexanderir TAKE ONE TABLET BY MOUTH EVERY DAY LAB WORK REQUIRED FOR ADDITIONAL REFILLS  Indications: ANXIETY WITH DEPRESSION  
  
 fexofenadine 180 mg tablet Commonly known as:  Luana Chock Take 1 Tab by mouth daily. folic acid 1 mg tablet Commonly known as:  Google Take 4 mg by mouth daily. furosemide 40 mg tablet Commonly known as:  LASIX Take 1 Tab by mouth daily. gabapentin 100 mg capsule Commonly known as:  NEURONTIN  
TAKE ONE (1) CAPSULE(S) THREE TIMESDAILY AS NEEDED. leucovorin calcium 5 mg tablet Commonly known as:  Lucyann Churn Take  by mouth every seven (7) days. levothyroxine 125 mcg tablet Commonly known as:  SYNTHROID  
TAKE ONE TABLET BY MOUTH EVERY DAY BEFORE BREAKFAST - LAB WORK &FOLLOW UP APPOINTMENT  
  
 methotrexate 2.5 mg tablet Commonly known as:  Forestine Candy Take 8 Tabs by mouth every Sunday. PRESERVISION AREDS 2 PO Take  by mouth. REMICADE 100 mg injection Generic drug:  inFLIXimab  
5 mg/kg by IntraVENous route once. [id number]  
  
 traMADol 50 mg tablet Commonly known as:  ULTRAM  
50 mg nightly as needed. Prescriptions Sent to Pharmacy Refills  
 amoxicillin-clavulanate (AUGMENTIN) 875-125 mg per tablet 0 Sig: Take 1 Tab by mouth every twelve (12) hours. Class: Normal  
 Pharmacy: [id number] at [address] [place] [address] Ph #: [phone] Route: Oral  
 albuterol (PROAIR HFA) 90 mcg/actuation inhaler 11 Sig: Take 2 Puffs by inhalation every six (6) hours as needed for Wheezing. Indications: Acute Asthma Attack  Class: Normal  
 Pharmacy: [id number] [address] [facility] #: [phone] Route: Inhalation We Performed the Following AMB POC RAPID STREP A [19300 CPT(R)] Introducing [facility] [place]! Dear Marifer Lino: Thank you for requesting a [employer] account. Our records indicate that you already have an active [employer] account. You can access your account anytime at [email or web address] Did you know that you can access your hospital and ER discharge instructions at any time in [employer]? You can also review all of your test results from your hospital stay or ER visit. Additional Information If you have questions, please visit the Frequently Asked Questions section of the [employer] website at [email or web address] Remember, [employer] is NOT to be used for urgent needs. For medical emergencies, dial 911. Now available from your iPhone and Android! Please provide this summary of care documentation to your next provider. Your primary care clinician is listed as [phone] If you have any questions after today's visit, please call [phone].

## 2017-11-14 RX ORDER — BUDESONIDE AND FORMOTEROL FUMARATE DIHYDRATE 160; 4.5 UG/1; UG/1
2 AEROSOL RESPIRATORY (INHALATION) 2 TIMES DAILY
Qty: 1 INHALER | Refills: 0 | Status: SHIPPED | COMMUNITY
Start: 2017-11-14 | End: 2018-02-28 | Stop reason: ALTCHOICE

## 2018-02-26 LAB — CREATININE, EXTERNAL: 0.63

## 2018-02-28 ENCOUNTER — OFFICE VISIT (OUTPATIENT)
Dept: INTERNAL MEDICINE CLINIC | Age: 63
End: 2018-02-28

## 2018-02-28 VITALS
OXYGEN SATURATION: 98 % | SYSTOLIC BLOOD PRESSURE: 118 MMHG | RESPIRATION RATE: 12 BRPM | HEART RATE: 76 BPM | HEIGHT: 70 IN | TEMPERATURE: 98.6 F | DIASTOLIC BLOOD PRESSURE: 74 MMHG

## 2018-02-28 DIAGNOSIS — R19.00 LEFT FLANK MASS: ICD-10-CM

## 2018-02-28 DIAGNOSIS — R19.7 DIARRHEA, UNSPECIFIED TYPE: Primary | ICD-10-CM

## 2018-02-28 PROBLEM — E66.01 OBESITY, MORBID (HCC): Status: ACTIVE | Noted: 2018-02-28

## 2018-02-28 RX ORDER — OMEPRAZOLE 20 MG/1
20 CAPSULE, DELAYED RELEASE ORAL DAILY
Qty: 30 CAP | Refills: 1 | Status: SHIPPED | OUTPATIENT
Start: 2018-02-28 | End: 2018-04-20

## 2018-03-01 ENCOUNTER — TELEPHONE (OUTPATIENT)
Dept: INTERNAL MEDICINE CLINIC | Age: 63
End: 2018-03-01

## 2018-03-01 NOTE — PROGRESS NOTES
HPI:  Juan Reed is a 61y.o. year old female who is here for a several issues. She is noticed a lump on her left flank that is become increasingly uncomfortable. She says it is mostly noticeable with pressure in that area. She denies any fevers or chills. Denies any change in bladder issues. She has noted ongoing issues with loose bowel movements that are at times lighter in color. She denies any melena. Some nausea but no vomiting. No fevers or chills. She does have some bloating and discomfort in her abdomen. She is status post gallbladder removal when she had her gastric bypass surgery. Her asthma is under good control currently on no medications. Past Medical History:   Diagnosis Date    Anxiety state, unspecified 6/23/2010    Asthma     Left atrial enlargement     RIGHT ATR. ENL.  Meniere's disease     Pulmonary embolism ([facility] [address] '98    MULTIPLE    RA (rheumatoid arthritis) ([facility] [address]     Thyroid disease     Unspecified asthma(493.90) 6/23/2010    Vitamin D deficiency        Past Surgical History:   Procedure Laterality Date    ABDOMEN SURGERY PROC UNLISTED      ENDOMETRIAL CRYOABLATION  1996    GASTRIC BYPASS,OBESE<150CM TONIA-EN-Y      HX BREAST LUMPECTOMY  '98    BENIGN    HX CERVICAL LAMINECTOMY  07/02/2015    C5-C6    HX COLONOSCOPY  05/06/2016    Dr. Jameel Shi - 8 yr f/u    HX KNEE ARTHROSCOPY      HX TONSILLECTOMY      REMOVAL OF COCCYX         Prior to Admission medications    Medication Sig Start Date End Date Taking? Authorizing Provider   omeprazole (PRILOSEC) 20 mg capsule Take 1 Cap by mouth daily. 2/28/18  Yes Luz Talavera MD   VIT C/E/ZN/COPPR/LUTEIN/ZEAXAN (PRESERVISION AREDS 2 PO) Take  by mouth. Yes Historical Provider   albuterol (PROAIR HFA) 90 mcg/actuation inhaler Take 2 Puffs by inhalation every six (6) hours as needed for Wheezing.  Indications: Acute Asthma Attack 11/6/17  Yes Alecia Barrow III, MD   furosemide (LASIX) 40 mg tablet Take 1 Tab by mouth daily. 9/20/17  Yes Adria Donnelly MD   levothyroxine (SYNTHROID) 125 mcg tablet TAKE ONE TABLET BY MOUTH EVERY DAY BEFORE BREAKFAST - LAB WORK &FOLLOW UP APPOINTMENT 4/11/17  Yes Norma Mcnulty III, MD   escitalopram oxalate (LEXAPRO) 20 mg tablet TAKE ONE TABLET BY MOUTH EVERY DAY LAB WORK REQUIRED FOR ADDITIONAL REFILLS  Indications: ANXIETY WITH DEPRESSION 2/22/17  Yes Adria Donnelly MD   ALPRAZolam Charley Balloon) 0.5 mg tablet Take 1 Tab by mouth three (3) times daily as needed for Sleep or Anxiety. Indications: ANXIETY 2/22/17  Yes Adria Donnelly MD   traMADol Lima Risser) 50 mg tablet 50 mg nightly as needed. 11/26/14  Yes Historical Provider   aspirin delayed-release 81 mg tablet Take  by mouth daily. Yes Historical Provider   leucovorin calcium (WELLCOVORIN) 5 mg tablet Take  by mouth every seven (7) days. Yes Historical Provider   fexofenadine (ALLEGRA) 180 mg tablet Take 1 Tab by mouth daily. 8/6/12  Yes Norma Mcnulty III, MD   methotrexate (RHEUMATREX) 2.5 mg tablet Take 8 Tabs by mouth every Sunday. 12/19/11  Yes Historical Provider   infliximab (REMICADE) 100 mg injection 5 mg/kg by IntraVENous route once. [id number]   Yes Historical Provider   folic acid (FOLVITE) 1 mg tablet Take 4 mg by mouth daily. Yes Historical Provider       Social History     Social History    Marital status:      Spouse name: N/A    Number of children: N/A    Years of education: N/A     Occupational History    Not on file.      Social History Main Topics    Smoking status: Never Smoker    Smokeless tobacco: Never Used    Alcohol use No    Drug use: No    Sexual activity: Yes     Partners: Male     Other Topics Concern    Not on file     Social History Narrative          ROS  Per HPI    Visit Vitals    /74    Pulse 76    Temp 98.6 °F (37 °C) (Oral)    Resp 12    Ht 5' 9.5\" (1.765 m)    SpO2 98%         Physical Exam   Physical Examination: General appearance - alert, well appearing, and in no distress  Mouth - mucous membranes moist, pharynx normal without lesions  Neck - supple, no significant adenopathy  Lymphatics - no palpable lymphadenopathy, no hepatosplenomegaly  Chest - clear to auscultation, no wheezes, rales or rhonchi, symmetric air entry  Heart - normal rate, regular rhythm, normal S1, S2, no murmurs, rubs, clicks or gallops  Abdomen - soft, nontender, nondistended, no masses or organomegaly  She does have a small nodule on the flexor aspect of her right wrist that is nontender. She has a fleshy nodule on her right upper arm as well. She has a large at least 10 cm nodule that is fleshy and soft on the left flank just be low the rib cage. Minimal tenderness. No redness or warmth. Assessment/Plan:  Diagnoses and all orders for this visit:    1. Diarrhea, unspecified type -concern for pancreatic insufficiency. For this reason will check pancreatic fecal enzymes and if confirmed we will consider imaging. Will treat with a PPI to see if some of her symptoms improve and if not further evaluate as well. -     PANCREATIC ELASTASE, FECAL; Future    2. Left flank mass -likely lipoma. Will get imaging with a soft tissue ultrasound and follow-up pending that result. 3.  Asthma stable on current meds. 4.  Rheumatoid arthritis per rheumatology. Other orders  -     omeprazole (PRILOSEC) 20 mg capsule; Take 1 Cap by mouth daily. Follow-up Disposition: after the above. Advised her to call back or return to office if symptoms worsen/change/persist.  Discussed expected course/resolution/complications of diagnosis in detail with patient. Medication risks/benefits/costs/interactions/alternatives discussed with patient. She was given an after visit summary which includes diagnoses, current medications, & vitals. She expressed understanding with the diagnosis and plan.

## 2018-03-02 DIAGNOSIS — R19.00 LEFT FLANK MASS: Primary | ICD-10-CM

## 2018-03-05 LAB — ELASTASE PANC STL-MCNT: >500 UG ELAST./G

## 2018-03-08 ENCOUNTER — HOSPITAL ENCOUNTER (OUTPATIENT)
Dept: ULTRASOUND IMAGING | Age: 63
Discharge: HOME | End: 2018-03-08
Payer: COMMERCIAL

## 2018-03-08 DIAGNOSIS — R19.00 LEFT FLANK MASS: ICD-10-CM

## 2018-03-08 PROCEDURE — 76705 ECHO EXAM OF ABDOMEN: CPT

## 2018-03-13 ENCOUNTER — OFFICE VISIT (OUTPATIENT)
Dept: SURGERY | Age: 63
End: 2018-03-13

## 2018-03-13 VITALS
TEMPERATURE: 98.5 F | RESPIRATION RATE: 20 BRPM | OXYGEN SATURATION: 98 % | DIASTOLIC BLOOD PRESSURE: 80 MMHG | HEIGHT: 70 IN | SYSTOLIC BLOOD PRESSURE: 130 MMHG | BODY MASS INDEX: 41.66 KG/M2 | HEART RATE: 74 BPM | WEIGHT: 291 LBS

## 2018-03-13 DIAGNOSIS — R19.00 LEFT FLANK MASS: Primary | ICD-10-CM

## 2018-03-13 NOTE — PROGRESS NOTES
1. Have you been to the ER, urgent care clinic since your last visit? Hospitalized since your last visit? No    2. Have you seen or consulted any other health care providers outside of the [address] [place] [address] since your last visit? Include any pap smears or colon screening.  No

## 2018-03-13 NOTE — LETTER
3/13/2018 2:45 PM 
 
Ms. Del Spurling [address] [facility] [address] Dear MS. Emir Covington, 
 
Surgery is scheduled as follows: 
 
Surgery date: 5-25-18 Location: [facility] 
 
Arrival time: 5:30am  Start time: 7:30am 
 
DO NOT EAT OR DRINK ANYTHING PAST MIDNIGHT THE NIGHT BEFORE SURGERY Pre-Registration: Date: 5-11-18  Time: 3:00pm Location: [facility] [address] ([facility]) [address] The nurses will review your medications with you at this time and also obtain the pre-testing that the doctor has ordered. Please allow approximately 1.5 hours for this appointment. 1st Post Operative appointment:  
 
Friday, June 08, 2018 10:20 AM with [address] Office Phone: [phone] For questions regarding this information please contact [place] at [phone]. Sincerely,  
 
Teo Gunter Surgical Scheduler Sincerely, 
 
 
Salvador Zacarias MD

## 2018-03-14 NOTE — PATIENT INSTRUCTIONS
Lipoma: Care Instructions  Your Care Instructions  A lipoma is a growth of fat just below the skin. It may feel soft and rubbery. Lipomas can occur anywhere on the body. But they are most common on the torso, neck, upper thighs, upper arms, and armpits. A lipoma does not turn into cancer. Lipomas usually are not treated, because most of them don't hurt or cause problems. But your doctor may remove a lipoma if it is painful, gets infected, or bothers you. Follow-up care is a key part of your treatment and safety. Be sure to make and go to all appointments, and call your doctor if you are having problems. It's also a good idea to know your test results and keep a list of the medicines you take. How can you care for yourself at home? · Lipomas usually need no care at home. · If your doctor removes a lipoma, follow directions for taking care of the cut (incision). When should you call for help? Call your doctor now or seek immediate medical care if:  ? · You have signs of infection, such as:  ¨ Increased pain, swelling, warmth, or redness. ¨ Red streaks leading from the lipoma. ¨ Pus draining from the lipoma. ¨ A fever. ? Watch closely for changes in your health, and be sure to contact your doctor if:  ? · The lipoma is growing or changing. ? · You do not get better as expected. Where can you learn more? Go to http://theresa-sandra.info/. Enter O722 in the search box to learn more about \"Lipoma: Care Instructions. \"  Current as of: October 13, 2016  Content Version: 11.4  © 3685-9711 [employer]. Care instructions adapted under license by [employer] (which disclaims liability or warranty for this information). If you have questions about a medical condition or this instruction, always ask your healthcare professional. [address] any warranty or liability for your use of this information.

## 2018-03-18 NOTE — PROGRESS NOTES
Surgery Consult    Subjective:      Elsie Ayala is a 61 y.o. female who is being seen for evaluation of left flank mass. She notes gradually enlarging left flank mass which has become tender to palpation. Pain is sharp, intermittent, 3/10 in intensity without radiation. No drainage, infection, cellulitis, chest pain or wheezing. She has history of gastric bypass with gradual weight gain. Patient Active Problem List    Diagnosis Date Noted    Left flank mass 03/13/2018    Obesity, morbid ([address] 02/28/2018    Depression 02/22/2017    Asthma     Left atrial enlargement     Meniere's disease     Pulmonary embolism (HCC)     RA (rheumatoid arthritis) ([address]     Thyroid disease     Vitamin D deficiency     Anxiety state 06/23/2010     Past Medical History:   Diagnosis Date    Anxiety state, unspecified 6/23/2010    Asthma     Left atrial enlargement     RIGHT ATR. ENL.  Meniere's disease     Pulmonary embolism ([address] '98    MULTIPLE    RA (rheumatoid arthritis) ([address]     Thyroid disease     Unspecified asthma(493.90) 6/23/2010    Vitamin D deficiency       Past Surgical History:   Procedure Laterality Date    ABDOMEN SURGERY PROC UNLISTED      ENDOMETRIAL CRYOABLATION  1996    GASTRIC BYPASS,OBESE<150CM TONIA-EN-Y      HX BREAST LUMPECTOMY  '98    BENIGN    HX CERVICAL LAMINECTOMY  07/02/2015    C5-C6    HX COLONOSCOPY  05/06/2016    Dr. Baltazar Mangum - 8 yr f/u    HX KNEE ARTHROSCOPY      HX TONSILLECTOMY      REMOVAL OF COCCYX        Social History   Substance Use Topics    Smoking status: Never Smoker    Smokeless tobacco: Never Used    Alcohol use No      Family History   Problem Relation Age of Onset    Diabetes Mother     Stroke Mother      TIA    Heart Disease Mother     Hypertension Mother     Heart Disease Father     Diabetes Father       Current Outpatient Prescriptions   Medication Sig    VIT C/E/ZN/COPPR/LUTEIN/ZEAXAN (PRESERVISION AREDS 2 PO) Take  by mouth.     furosemide (LASIX) 40 mg tablet Take 1 Tab by mouth daily.  levothyroxine (SYNTHROID) 125 mcg tablet TAKE ONE TABLET BY MOUTH EVERY DAY BEFORE BREAKFAST - LAB WORK &FOLLOW UP APPOINTMENT    escitalopram oxalate (LEXAPRO) 20 mg tablet TAKE ONE TABLET BY MOUTH EVERY DAY LAB WORK REQUIRED FOR ADDITIONAL REFILLS  Indications: ANXIETY WITH DEPRESSION    traMADol (ULTRAM) 50 mg tablet 50 mg nightly as needed.  aspirin delayed-release 81 mg tablet Take  by mouth daily.  leucovorin calcium (WELLCOVORIN) 5 mg tablet Take  by mouth every seven (7) days.  fexofenadine (ALLEGRA) 180 mg tablet Take 1 Tab by mouth daily.  methotrexate (RHEUMATREX) 2.5 mg tablet Take 8 Tabs by mouth every Sunday.  infliximab (REMICADE) 100 mg injection 5 mg/kg by IntraVENous route once. [id number]    folic acid (FOLVITE) 1 mg tablet Take 4 mg by mouth daily.  omeprazole (PRILOSEC) 20 mg capsule Take 1 Cap by mouth daily.  albuterol (PROAIR HFA) 90 mcg/actuation inhaler Take 2 Puffs by inhalation every six (6) hours as needed for Wheezing. Indications: Acute Asthma Attack    ALPRAZolam (XANAX) 0.5 mg tablet Take 1 Tab by mouth three (3) times daily as needed for Sleep or Anxiety. Indications: ANXIETY     No current facility-administered medications for this visit. No Known Allergies    Review of Systems:    A complete review of systems was negative except as noted in the HPI. Objective:        Visit Vitals    /80    Pulse 74    Temp 98.5 °F (36.9 °C)    Resp 20    Ht 5' 9.5\" (1.765 m)    Wt 291 lb (132 kg)    SpO2 98%    BMI 42.36 kg/m2       Physical Exam:  GENERAL: alert, cooperative, no distress, appears stated age, morbidly obese, EYE: negative, LYMPH NODES: Cervical, supraclavicular nodes normal.  THROAT & NECK: normal, LUNG: clear to auscultation bilaterally, HEART: regular rate and rhythm, S1, S2 normal, no murmur.   ABDOMEN: Obese, NABS, non-distended, soft; 6 x 4 cm left flank subcutaneous mass (mobile, tender, no cellulitis). EXTREMITIES:  extremities normal, atraumatic, no cyanosis or edema, SKIN: Normal., NEUROLOGIC: negative. Assessment:     Left flank mass, tender to palpation. Plan:     1. I recommend proceeding with excisional biopsy. 2. Discussed aspects of surgical intervention, methods, risks (including by not limited to infection, bleeding, hematoma, and recurrence) and the risks of the anesthetic. The patient understands the risks; all questions were answered to the patient's satisfaction. 3. Patient does wish to proceed with surgery.       Signed By: Gregg Cordova MD     March 18, 2018

## 2018-03-29 RX ORDER — ESCITALOPRAM OXALATE 20 MG/1
20 TABLET ORAL DAILY
Qty: 90 TAB | Refills: 1 | Status: SHIPPED | OUTPATIENT
Start: 2018-03-29 | End: 2018-09-26 | Stop reason: SDUPTHER

## 2018-03-29 NOTE — TELEPHONE ENCOUNTER
Orders Placed This Encounter    escitalopram oxalate (LEXAPRO) 20 mg tablet     Sig: Take 1 Tab by mouth daily. Indications: ANXIETY WITH DEPRESSION     Dispense:  90 Tab     Refill:  1     The above medication refills were approved via verbal order by Dr. Cortney Vazquez III.

## 2018-03-29 NOTE — TELEPHONE ENCOUNTER
From: [facility]  To: Riana De Leon MD  Sent: 3/29/2018 1:08 PM EDT  Subject: Medication Renewal Request    Original authorizing provider: Riana De Leon MD    [facility] would like a refill of the following medications:  escitalopram oxalate (LEXAPRO) 20 mg tablet Riana De Leon MD]    Preferred pharmacy: [facility], [place] [address]    Comment:  Raul hasnt heard back from you for refill request. Could you please refill?  Thank you

## 2018-04-10 ENCOUNTER — OFFICE VISIT (OUTPATIENT)
Dept: SURGERY | Age: 63
End: 2018-04-10

## 2018-04-10 VITALS
HEIGHT: 70 IN | HEART RATE: 62 BPM | SYSTOLIC BLOOD PRESSURE: 110 MMHG | TEMPERATURE: 97.9 F | DIASTOLIC BLOOD PRESSURE: 64 MMHG | WEIGHT: 289.5 LBS | BODY MASS INDEX: 41.44 KG/M2 | OXYGEN SATURATION: 98 % | RESPIRATION RATE: 20 BRPM

## 2018-04-10 DIAGNOSIS — R14.0 ABDOMINAL BLOATING: ICD-10-CM

## 2018-04-10 DIAGNOSIS — R10.33 PERIUMBILICAL ABDOMINAL PAIN: Primary | ICD-10-CM

## 2018-04-10 NOTE — PROGRESS NOTES
1. Have you been to the ER, urgent care clinic since your last visit? Hospitalized since your last visit? No    2. Have you seen or consulted any other health care providers outside of the [address] [place] [address] since your last visit? Include any pap smears or colon screening.  No

## 2018-04-10 NOTE — PATIENT INSTRUCTIONS
Abdominal Pain: Care Instructions  Your Care Instructions    Abdominal pain has many possible causes. Some aren't serious and get better on their own in a few days. Others need more testing and treatment. If your pain continues or gets worse, you need to be rechecked and may need more tests to find out what is wrong. You may need surgery to correct the problem. Don't ignore new symptoms, such as fever, nausea and vomiting, urination problems, pain that gets worse, and dizziness. These may be signs of a more serious problem. Your doctor may have recommended a follow-up visit in the next 8 to 12 hours. If you are not getting better, you may need more tests or treatment. The doctor has checked you carefully, but problems can develop later. If you notice any problems or new symptoms, get medical treatment right away. Follow-up care is a key part of your treatment and safety. Be sure to make and go to all appointments, and call your doctor if you are having problems. It's also a good idea to know your test results and keep a list of the medicines you take. How can you care for yourself at home? · Rest until you feel better. · To prevent dehydration, drink plenty of fluids, enough so that your urine is light yellow or clear like water. Choose water and other caffeine-free clear liquids until you feel better. If you have kidney, heart, or liver disease and have to limit fluids, talk with your doctor before you increase the amount of fluids you drink. · If your stomach is upset, eat mild foods, such as rice, dry toast or crackers, bananas, and applesauce. Try eating several small meals instead of two or three large ones. · Wait until 48 hours after all symptoms have gone away before you have spicy foods, alcohol, and drinks that contain caffeine. · Do not eat foods that are high in fat. · Avoid anti-inflammatory medicines such as aspirin, ibuprofen (Advil, Motrin), and naproxen (Aleve).  These can cause stomach upset. Talk to your doctor if you take daily aspirin for another health problem. When should you call for help? Call 911 anytime you think you may need emergency care. For example, call if:  ? · You passed out (lost consciousness). ? · You pass maroon or very bloody stools. ? · You vomit blood or what looks like coffee grounds. ? · You have new, severe belly pain. ?Call your doctor now or seek immediate medical care if:  ? · Your pain gets worse, especially if it becomes focused in one area of your belly. ? · You have a new or higher fever. ? · Your stools are black and look like tar, or they have streaks of blood. ? · You have unexpected vaginal bleeding. ? · You have symptoms of a urinary tract infection. These may include:  ¨ Pain when you urinate. ¨ Urinating more often than usual.  ¨ Blood in your urine. ? · You are dizzy or lightheaded, or you feel like you may faint. ? Watch closely for changes in your health, and be sure to contact your doctor if:  ? · You are not getting better after 1 day (24 hours). Where can you learn more? Go to http://theresa-sandra.info/. Enter T044 in the search box to learn more about \"Abdominal Pain: Care Instructions. \"  Current as of: March 20, 2017  Content Version: 11.4  © 5567-7062 [employer]. Care instructions adapted under license by [employer] (which disclaims liability or warranty for this information). If you have questions about a medical condition or this instruction, always ask your healthcare professional. [place] [address] any warranty or liability for your use of this information.

## 2018-04-16 ENCOUNTER — HOSPITAL ENCOUNTER (OUTPATIENT)
Dept: CT IMAGING | Age: 63
Discharge: HOME | End: 2018-04-16
Payer: COMMERCIAL

## 2018-04-16 DIAGNOSIS — R10.33 PERIUMBILICAL ABDOMINAL PAIN: ICD-10-CM

## 2018-04-16 DIAGNOSIS — R14.0 ABDOMINAL BLOATING: ICD-10-CM

## 2018-04-16 PROCEDURE — 74177 CT ABD & PELVIS W/CONTRAST: CPT

## 2018-04-16 RX ORDER — BARIUM SULFATE 20 MG/ML
900 SUSPENSION ORAL
Status: COMPLETED | OUTPATIENT
Start: 2018-04-16 | End: 2018-04-16

## 2018-04-16 RX ORDER — SODIUM CHLORIDE 0.9 % (FLUSH) 0.9 %
10 SYRINGE (ML) INJECTION
Status: COMPLETED | OUTPATIENT
Start: 2018-04-16 | End: 2018-04-16

## 2018-04-16 RX ADMIN — Medication 10 ML: at 10:39

## 2018-04-16 RX ADMIN — BARIUM SULFATE 900 ML: 20 SUSPENSION ORAL at 10:39

## 2018-04-17 ENCOUNTER — TELEPHONE (OUTPATIENT)
Dept: SURGERY | Age: 63
End: 2018-04-17

## 2018-04-19 ENCOUNTER — TELEPHONE (OUTPATIENT)
Dept: SURGERY | Age: 63
End: 2018-04-19

## 2018-04-19 NOTE — TELEPHONE ENCOUNTER
Patient asked to find out how much time will be needed for recovery to notify employer and completer [employer] forms

## 2018-04-19 NOTE — TELEPHONE ENCOUNTER
I called the patient and she wants to know about how long she will be out for surgery. I told her 2-4 weeks post op. She then wanted to know if she could work from home on her computer. I told her we want her to focus on her diet and liquids post op and we don't want her working while on pain medication. Pt in agreement.

## 2018-04-19 NOTE — TELEPHONE ENCOUNTER
Pt called and said she is scheduled for her IV dose of Remicade tomorrow and she wanted to know if this was okay as she is now having surgery on Monday. I told her I will check with dr Mei Garcia and call her back. Pt in agreement.

## 2018-04-19 NOTE — TELEPHONE ENCOUNTER
I called the patient back after speaking to Dr Cindy Hayward and I let her know that he said No to the Remicade infusion tomorrow. She said she will call and cancel the infusion for tomorrow. Pt in agreement.

## 2018-04-20 ENCOUNTER — HOSPITAL ENCOUNTER (OUTPATIENT)
Dept: PREADMISSION TESTING | Age: 63
Discharge: HOME | End: 2018-04-20
Payer: COMMERCIAL

## 2018-04-20 ENCOUNTER — HOSPITAL ENCOUNTER (OUTPATIENT)
Dept: GENERAL RADIOLOGY | Age: 63
Discharge: HOME | End: 2018-04-20
Payer: COMMERCIAL

## 2018-04-20 VITALS
HEIGHT: 70 IN | SYSTOLIC BLOOD PRESSURE: 121 MMHG | BODY MASS INDEX: 40.86 KG/M2 | TEMPERATURE: 97.9 F | DIASTOLIC BLOOD PRESSURE: 73 MMHG | HEART RATE: 65 BPM | WEIGHT: 285.38 LBS

## 2018-04-20 DIAGNOSIS — Z01.818 PRE-OP EXAM: ICD-10-CM

## 2018-04-20 LAB
ALBUMIN SERPL-MCNC: 3.5 G/DL (ref 3.5–5)
ALBUMIN/GLOB SERPL: 1 {RATIO} (ref 1.1–2.2)
ALP SERPL-CCNC: 82 U/L (ref 45–117)
ALT SERPL-CCNC: 25 U/L (ref 12–78)
ANION GAP SERPL CALC-SCNC: 8 MMOL/L (ref 5–15)
AST SERPL-CCNC: 16 U/L (ref 15–37)
BASOPHILS # BLD: 0.1 K/UL (ref 0–0.1)
BASOPHILS NFR BLD: 2 % (ref 0–1)
BILIRUB SERPL-MCNC: 0.4 MG/DL (ref 0.2–1)
BUN SERPL-MCNC: 15 MG/DL (ref 6–20)
BUN/CREAT SERPL: 21 (ref 12–20)
CALCIUM SERPL-MCNC: 8.5 MG/DL (ref 8.5–10.1)
CHLORIDE SERPL-SCNC: 105 MMOL/L (ref 97–108)
CO2 SERPL-SCNC: 29 MMOL/L (ref 21–32)
CREAT SERPL-MCNC: 0.71 MG/DL (ref 0.55–1.02)
DIFFERENTIAL METHOD BLD: ABNORMAL
EOSINOPHIL # BLD: 0.2 K/UL (ref 0–0.4)
EOSINOPHIL NFR BLD: 4 % (ref 0–7)
ERYTHROCYTE [DISTWIDTH] IN BLOOD BY AUTOMATED COUNT: 15.9 % (ref 11.5–14.5)
GLOBULIN SER CALC-MCNC: 3.4 G/DL (ref 2–4)
GLUCOSE SERPL-MCNC: 82 MG/DL (ref 65–100)
HCT VFR BLD AUTO: 38 % (ref 35–47)
HGB BLD-MCNC: 12 G/DL (ref 11.5–16)
IMM GRANULOCYTES # BLD: 0 K/UL (ref 0–0.04)
IMM GRANULOCYTES NFR BLD AUTO: 0 % (ref 0–0.5)
LYMPHOCYTES # BLD: 1.5 K/UL (ref 0.8–3.5)
LYMPHOCYTES NFR BLD: 29 % (ref 12–49)
MAGNESIUM SERPL-MCNC: 2.1 MG/DL (ref 1.6–2.4)
MCH RBC QN AUTO: 27.8 PG (ref 26–34)
MCHC RBC AUTO-ENTMCNC: 31.6 G/DL (ref 30–36.5)
MCV RBC AUTO: 88.2 FL (ref 80–99)
MONOCYTES # BLD: 0.3 K/UL (ref 0–1)
MONOCYTES NFR BLD: 6 % (ref 5–13)
NEUTS SEG # BLD: 2.9 K/UL (ref 1.8–8)
NEUTS SEG NFR BLD: 58 % (ref 32–75)
NRBC # BLD: 0 K/UL (ref 0–0.01)
NRBC BLD-RTO: 0 PER 100 WBC
PLATELET # BLD AUTO: 248 K/UL (ref 150–400)
PMV BLD AUTO: 12.3 FL (ref 8.9–12.9)
POTASSIUM SERPL-SCNC: 4.2 MMOL/L (ref 3.5–5.1)
PROT SERPL-MCNC: 6.9 G/DL (ref 6.4–8.2)
RBC # BLD AUTO: 4.31 M/UL (ref 3.8–5.2)
SODIUM SERPL-SCNC: 142 MMOL/L (ref 136–145)
WBC # BLD AUTO: 5 K/UL (ref 3.6–11)

## 2018-04-20 PROCEDURE — 85025 COMPLETE CBC W/AUTO DIFF WBC: CPT | Performed by: SURGERY

## 2018-04-20 PROCEDURE — 80053 COMPREHEN METABOLIC PANEL: CPT | Performed by: SURGERY

## 2018-04-20 PROCEDURE — 93005 ELECTROCARDIOGRAM TRACING: CPT

## 2018-04-20 PROCEDURE — 83735 ASSAY OF MAGNESIUM: CPT | Performed by: SURGERY

## 2018-04-20 PROCEDURE — 71046 X-RAY EXAM CHEST 2 VIEWS: CPT

## 2018-04-20 NOTE — PERIOP NOTES
PREOPERATIVE INSTRUCTIONS REVIEWED WITH PATIENT. PATIENT GIVEN TWO-- SIX PACKS OF CHG WIPES. INSTRUCTIONS REVIEWED ON USE OF CHG WIPES. PATIENT GIVEN SSI INFECTIONS SHEET. PATIENT WAS GIVEN THE OPPORTUNITY TO ASK QUESTIONS ON THE INFORMATION PROVIDED. PATIENT GIVEN INSTRUCTIONS/DIRECTIONS FOR CXR TO BE DONE AT [address] [place] [address]; ORDER ENTERED.

## 2018-04-21 LAB
ATRIAL RATE: 58 BPM
CALCULATED P AXIS, ECG09: 15 DEGREES
CALCULATED R AXIS, ECG10: 2 DEGREES
CALCULATED T AXIS, ECG11: 9 DEGREES
DIAGNOSIS, 93000: NORMAL
P-R INTERVAL, ECG05: 150 MS
Q-T INTERVAL, ECG07: 458 MS
QRS DURATION, ECG06: 68 MS
QTC CALCULATION (BEZET), ECG08: 449 MS
VENTRICULAR RATE, ECG03: 58 BPM

## 2018-04-22 ENCOUNTER — ANESTHESIA EVENT (OUTPATIENT)
Dept: SURGERY | Age: 63
DRG: 348 | End: 2018-04-22
Payer: COMMERCIAL

## 2018-04-23 ENCOUNTER — ANESTHESIA (OUTPATIENT)
Dept: SURGERY | Age: 63
DRG: 348 | End: 2018-04-23
Payer: COMMERCIAL

## 2018-04-23 ENCOUNTER — HOSPITAL ENCOUNTER (INPATIENT)
Age: 63
LOS: 2 days | Discharge: HOME | DRG: 348 | End: 2018-04-25
Attending: SURGERY | Admitting: SURGERY
Payer: COMMERCIAL

## 2018-04-23 PROBLEM — K56.1 INTUSSUSCEPTION OF JEJUNUM (HCC): Status: ACTIVE | Noted: 2018-04-23

## 2018-04-23 PROCEDURE — 77030038157 HC DEV PWR CNTR DISP SIGNIA COVD -C: Performed by: SURGERY

## 2018-04-23 PROCEDURE — 77030009956 HC RELD ENDOSTCH COVD -B: Performed by: SURGERY

## 2018-04-23 PROCEDURE — 77030008756 HC TU IRR SUC STRY -B: Performed by: SURGERY

## 2018-04-23 PROCEDURE — 74011000250 HC RX REV CODE- 250: Performed by: SURGERY

## 2018-04-23 PROCEDURE — 77030037892: Performed by: SURGERY

## 2018-04-23 PROCEDURE — 74011250636 HC RX REV CODE- 250/636: Performed by: SURGERY

## 2018-04-23 PROCEDURE — 76060000034 HC ANESTHESIA 1.5 TO 2 HR: Performed by: SURGERY

## 2018-04-23 PROCEDURE — 77030009957 HC RELD ENDOSTCH COVD -C: Performed by: SURGERY

## 2018-04-23 PROCEDURE — 74011000258 HC RX REV CODE- 258: Performed by: SURGERY

## 2018-04-23 PROCEDURE — 77030002895 HC DEV VASC CLOSR COVD -B: Performed by: SURGERY

## 2018-04-23 PROCEDURE — 77030035048 HC TRCR ENDOSC OPTCL COVD -B: Performed by: SURGERY

## 2018-04-23 PROCEDURE — 74011250637 HC RX REV CODE- 250/637: Performed by: SURGERY

## 2018-04-23 PROCEDURE — 77030037032 HC INSRT SCIS CLICKLLINE DISP STOR -B: Performed by: SURGERY

## 2018-04-23 PROCEDURE — 74011000250 HC RX REV CODE- 250

## 2018-04-23 PROCEDURE — 77030009965 HC RELD STPLR ENDOS COVD -D: Performed by: SURGERY

## 2018-04-23 PROCEDURE — 0DBA4ZZ EXCISION OF JEJUNUM, PERCUTANEOUS ENDOSCOPIC APPROACH: ICD-10-PCS | Performed by: SURGERY

## 2018-04-23 PROCEDURE — 74011000258 HC RX REV CODE- 258

## 2018-04-23 PROCEDURE — 77030032490 HC SLV COMPR SCD KNE COVD -B: Performed by: SURGERY

## 2018-04-23 PROCEDURE — 88307 TISSUE EXAM BY PATHOLOGIST: CPT | Performed by: SURGERY

## 2018-04-23 PROCEDURE — 77030020782 HC GWN BAIR PAWS FLX 3M -B

## 2018-04-23 PROCEDURE — 65270000032 HC RM SEMIPRIVATE

## 2018-04-23 PROCEDURE — 77030034154 HC SHR COAG HARM ACE J&J -F: Performed by: SURGERY

## 2018-04-23 PROCEDURE — 77030018836 HC SOL IRR NACL ICUM -A: Performed by: SURGERY

## 2018-04-23 PROCEDURE — 74011250636 HC RX REV CODE- 250/636

## 2018-04-23 PROCEDURE — 77030035029 HC NDL INSUF VERES DISP COVD -B: Performed by: SURGERY

## 2018-04-23 PROCEDURE — 77030026438 HC STYL ET INTUB CARD -A: Performed by: ANESTHESIOLOGY

## 2018-04-23 PROCEDURE — 74011250636 HC RX REV CODE- 250/636: Performed by: ANESTHESIOLOGY

## 2018-04-23 PROCEDURE — 77030027138 HC INCENT SPIROMETER -A

## 2018-04-23 PROCEDURE — 77030031139 HC SUT VCRL2 J&J -A: Performed by: SURGERY

## 2018-04-23 PROCEDURE — 76010000162 HC OR TIME 1.5 TO 2 HR INTENSV-TIER 1: Performed by: SURGERY

## 2018-04-23 PROCEDURE — 77030035045 HC TRCR ENDOSC VRSPRT BLDLSS COVD -B: Performed by: SURGERY

## 2018-04-23 PROCEDURE — 77030035051: Performed by: SURGERY

## 2018-04-23 PROCEDURE — 77030008684 HC TU ET CUF COVD -B: Performed by: ANESTHESIOLOGY

## 2018-04-23 PROCEDURE — 77030002933 HC SUT MCRYL J&J -A: Performed by: SURGERY

## 2018-04-23 PROCEDURE — 77030011640 HC PAD GRND REM COVD -A: Performed by: SURGERY

## 2018-04-23 PROCEDURE — 76210000016 HC OR PH I REC 1 TO 1.5 HR: Performed by: SURGERY

## 2018-04-23 PROCEDURE — 77030020747 HC TU INSUF ENDOSC TELE -A: Performed by: SURGERY

## 2018-04-23 PROCEDURE — 77030008023 HC RELD SUT ENDOSC COVD -B: Performed by: SURGERY

## 2018-04-23 RX ORDER — BUPIVACAINE HYDROCHLORIDE 5 MG/ML
50 INJECTION, SOLUTION EPIDURAL; INTRACAUDAL ONCE
Status: COMPLETED | OUTPATIENT
Start: 2018-04-23 | End: 2018-04-23

## 2018-04-23 RX ORDER — EPHEDRINE SULFATE 50 MG/ML
INJECTION, SOLUTION INTRAVENOUS AS NEEDED
Status: DISCONTINUED | OUTPATIENT
Start: 2018-04-23 | End: 2018-04-23 | Stop reason: HOSPADM

## 2018-04-23 RX ORDER — SODIUM CHLORIDE, SODIUM LACTATE, POTASSIUM CHLORIDE, CALCIUM CHLORIDE 600; 310; 30; 20 MG/100ML; MG/100ML; MG/100ML; MG/100ML
125 INJECTION, SOLUTION INTRAVENOUS CONTINUOUS
Status: DISCONTINUED | OUTPATIENT
Start: 2018-04-23 | End: 2018-04-23 | Stop reason: HOSPADM

## 2018-04-23 RX ORDER — SODIUM CHLORIDE 0.9 % (FLUSH) 0.9 %
5-10 SYRINGE (ML) INJECTION AS NEEDED
Status: DISCONTINUED | OUTPATIENT
Start: 2018-04-23 | End: 2018-04-23 | Stop reason: HOSPADM

## 2018-04-23 RX ORDER — MIDAZOLAM HYDROCHLORIDE 1 MG/ML
INJECTION, SOLUTION INTRAMUSCULAR; INTRAVENOUS AS NEEDED
Status: DISCONTINUED | OUTPATIENT
Start: 2018-04-23 | End: 2018-04-23 | Stop reason: HOSPADM

## 2018-04-23 RX ORDER — ESCITALOPRAM OXALATE 10 MG/1
20 TABLET ORAL
Status: DISCONTINUED | OUTPATIENT
Start: 2018-04-23 | End: 2018-04-25 | Stop reason: HOSPADM

## 2018-04-23 RX ORDER — ACETAMINOPHEN 500 MG
1000 TABLET ORAL
COMMUNITY

## 2018-04-23 RX ORDER — SODIUM CHLORIDE, SODIUM LACTATE, POTASSIUM CHLORIDE, CALCIUM CHLORIDE 600; 310; 30; 20 MG/100ML; MG/100ML; MG/100ML; MG/100ML
125 INJECTION, SOLUTION INTRAVENOUS CONTINUOUS
Status: DISCONTINUED | OUTPATIENT
Start: 2018-04-23 | End: 2018-04-25

## 2018-04-23 RX ORDER — ALBUTEROL SULFATE 90 UG/1
2 AEROSOL, METERED RESPIRATORY (INHALATION)
Status: DISCONTINUED | OUTPATIENT
Start: 2018-04-23 | End: 2018-04-23 | Stop reason: CLARIF

## 2018-04-23 RX ORDER — MIDAZOLAM HYDROCHLORIDE 1 MG/ML
0.5 INJECTION, SOLUTION INTRAMUSCULAR; INTRAVENOUS
Status: DISCONTINUED | OUTPATIENT
Start: 2018-04-23 | End: 2018-04-23 | Stop reason: HOSPADM

## 2018-04-23 RX ORDER — ONDANSETRON 2 MG/ML
4 INJECTION INTRAMUSCULAR; INTRAVENOUS
Status: DISCONTINUED | OUTPATIENT
Start: 2018-04-23 | End: 2018-04-25 | Stop reason: HOSPADM

## 2018-04-23 RX ORDER — PROPOFOL 10 MG/ML
INJECTION, EMULSION INTRAVENOUS AS NEEDED
Status: DISCONTINUED | OUTPATIENT
Start: 2018-04-23 | End: 2018-04-23 | Stop reason: HOSPADM

## 2018-04-23 RX ORDER — SODIUM CHLORIDE 0.9 % (FLUSH) 0.9 %
5-10 SYRINGE (ML) INJECTION EVERY 8 HOURS
Status: DISCONTINUED | OUTPATIENT
Start: 2018-04-23 | End: 2018-04-25 | Stop reason: HOSPADM

## 2018-04-23 RX ORDER — SODIUM CHLORIDE 0.9 % (FLUSH) 0.9 %
5-10 SYRINGE (ML) INJECTION EVERY 8 HOURS
Status: DISCONTINUED | OUTPATIENT
Start: 2018-04-23 | End: 2018-04-23 | Stop reason: HOSPADM

## 2018-04-23 RX ORDER — ROCURONIUM BROMIDE 10 MG/ML
INJECTION, SOLUTION INTRAVENOUS AS NEEDED
Status: DISCONTINUED | OUTPATIENT
Start: 2018-04-23 | End: 2018-04-23 | Stop reason: HOSPADM

## 2018-04-23 RX ORDER — KETOROLAC TROMETHAMINE 30 MG/ML
15 INJECTION, SOLUTION INTRAMUSCULAR; INTRAVENOUS EVERY 6 HOURS
Status: COMPLETED | OUTPATIENT
Start: 2018-04-23 | End: 2018-04-24

## 2018-04-23 RX ORDER — DEXAMETHASONE SODIUM PHOSPHATE 4 MG/ML
INJECTION, SOLUTION INTRA-ARTICULAR; INTRALESIONAL; INTRAMUSCULAR; INTRAVENOUS; SOFT TISSUE AS NEEDED
Status: DISCONTINUED | OUTPATIENT
Start: 2018-04-23 | End: 2018-04-23 | Stop reason: HOSPADM

## 2018-04-23 RX ORDER — MIDAZOLAM HYDROCHLORIDE 1 MG/ML
1 INJECTION, SOLUTION INTRAMUSCULAR; INTRAVENOUS AS NEEDED
Status: DISCONTINUED | OUTPATIENT
Start: 2018-04-23 | End: 2018-04-23 | Stop reason: HOSPADM

## 2018-04-23 RX ORDER — OXYCODONE AND ACETAMINOPHEN 5; 325 MG/1; MG/1
1 TABLET ORAL AS NEEDED
Status: DISCONTINUED | OUTPATIENT
Start: 2018-04-23 | End: 2018-04-23 | Stop reason: HOSPADM

## 2018-04-23 RX ORDER — ALBUTEROL SULFATE 0.83 MG/ML
2.5 SOLUTION RESPIRATORY (INHALATION)
Status: DISCONTINUED | OUTPATIENT
Start: 2018-04-23 | End: 2018-04-25 | Stop reason: HOSPADM

## 2018-04-23 RX ORDER — SODIUM CHLORIDE 0.9 % (FLUSH) 0.9 %
5-10 SYRINGE (ML) INJECTION AS NEEDED
Status: DISCONTINUED | OUTPATIENT
Start: 2018-04-23 | End: 2018-04-25 | Stop reason: HOSPADM

## 2018-04-23 RX ORDER — DIPHENHYDRAMINE HYDROCHLORIDE 50 MG/ML
12.5 INJECTION, SOLUTION INTRAMUSCULAR; INTRAVENOUS AS NEEDED
Status: DISCONTINUED | OUTPATIENT
Start: 2018-04-23 | End: 2018-04-23 | Stop reason: HOSPADM

## 2018-04-23 RX ORDER — SODIUM CHLORIDE, SODIUM LACTATE, POTASSIUM CHLORIDE, CALCIUM CHLORIDE 600; 310; 30; 20 MG/100ML; MG/100ML; MG/100ML; MG/100ML
INJECTION, SOLUTION INTRAVENOUS
Status: DISCONTINUED | OUTPATIENT
Start: 2018-04-23 | End: 2018-04-23 | Stop reason: HOSPADM

## 2018-04-23 RX ORDER — PHENYLEPHRINE HCL IN 0.9% NACL 0.4MG/10ML
SYRINGE (ML) INTRAVENOUS AS NEEDED
Status: DISCONTINUED | OUTPATIENT
Start: 2018-04-23 | End: 2018-04-23 | Stop reason: HOSPADM

## 2018-04-23 RX ORDER — SODIUM CHLORIDE 9 MG/ML
25 INJECTION, SOLUTION INTRAVENOUS CONTINUOUS
Status: DISCONTINUED | OUTPATIENT
Start: 2018-04-23 | End: 2018-04-23 | Stop reason: HOSPADM

## 2018-04-23 RX ORDER — KETOROLAC TROMETHAMINE 30 MG/ML
INJECTION, SOLUTION INTRAMUSCULAR; INTRAVENOUS AS NEEDED
Status: DISCONTINUED | OUTPATIENT
Start: 2018-04-23 | End: 2018-04-23 | Stop reason: HOSPADM

## 2018-04-23 RX ORDER — SUCCINYLCHOLINE CHLORIDE 20 MG/ML
INJECTION INTRAMUSCULAR; INTRAVENOUS AS NEEDED
Status: DISCONTINUED | OUTPATIENT
Start: 2018-04-23 | End: 2018-04-23 | Stop reason: HOSPADM

## 2018-04-23 RX ORDER — FENTANYL CITRATE 50 UG/ML
25 INJECTION, SOLUTION INTRAMUSCULAR; INTRAVENOUS
Status: DISCONTINUED | OUTPATIENT
Start: 2018-04-23 | End: 2018-04-23 | Stop reason: HOSPADM

## 2018-04-23 RX ORDER — HYDROMORPHONE HYDROCHLORIDE 2 MG/ML
1 INJECTION, SOLUTION INTRAMUSCULAR; INTRAVENOUS; SUBCUTANEOUS
Status: DISCONTINUED | OUTPATIENT
Start: 2018-04-23 | End: 2018-04-25 | Stop reason: HOSPADM

## 2018-04-23 RX ORDER — ENOXAPARIN SODIUM 100 MG/ML
40 INJECTION SUBCUTANEOUS EVERY 24 HOURS
Status: DISCONTINUED | OUTPATIENT
Start: 2018-04-24 | End: 2018-04-25 | Stop reason: HOSPADM

## 2018-04-23 RX ORDER — LIDOCAINE HYDROCHLORIDE 20 MG/ML
INJECTION, SOLUTION EPIDURAL; INFILTRATION; INTRACAUDAL; PERINEURAL AS NEEDED
Status: DISCONTINUED | OUTPATIENT
Start: 2018-04-23 | End: 2018-04-23 | Stop reason: HOSPADM

## 2018-04-23 RX ORDER — LIDOCAINE HYDROCHLORIDE 10 MG/ML
0.1 INJECTION, SOLUTION EPIDURAL; INFILTRATION; INTRACAUDAL; PERINEURAL AS NEEDED
Status: DISCONTINUED | OUTPATIENT
Start: 2018-04-23 | End: 2018-04-23 | Stop reason: HOSPADM

## 2018-04-23 RX ORDER — GLYCOPYRROLATE 0.2 MG/ML
INJECTION INTRAMUSCULAR; INTRAVENOUS AS NEEDED
Status: DISCONTINUED | OUTPATIENT
Start: 2018-04-23 | End: 2018-04-23 | Stop reason: HOSPADM

## 2018-04-23 RX ORDER — DIPHENHYDRAMINE HYDROCHLORIDE 50 MG/ML
25 INJECTION, SOLUTION INTRAMUSCULAR; INTRAVENOUS
Status: ACTIVE | OUTPATIENT
Start: 2018-04-23 | End: 2018-04-24

## 2018-04-23 RX ORDER — FENTANYL CITRATE 50 UG/ML
50 INJECTION, SOLUTION INTRAMUSCULAR; INTRAVENOUS AS NEEDED
Status: DISCONTINUED | OUTPATIENT
Start: 2018-04-23 | End: 2018-04-23 | Stop reason: HOSPADM

## 2018-04-23 RX ORDER — ONDANSETRON 2 MG/ML
INJECTION INTRAMUSCULAR; INTRAVENOUS AS NEEDED
Status: DISCONTINUED | OUTPATIENT
Start: 2018-04-23 | End: 2018-04-23 | Stop reason: HOSPADM

## 2018-04-23 RX ORDER — LORAZEPAM 2 MG/ML
1 INJECTION INTRAMUSCULAR
Status: DISCONTINUED | OUTPATIENT
Start: 2018-04-23 | End: 2018-04-25 | Stop reason: HOSPADM

## 2018-04-23 RX ORDER — HYDROMORPHONE HYDROCHLORIDE 2 MG/ML
INJECTION, SOLUTION INTRAMUSCULAR; INTRAVENOUS; SUBCUTANEOUS AS NEEDED
Status: DISCONTINUED | OUTPATIENT
Start: 2018-04-23 | End: 2018-04-23 | Stop reason: HOSPADM

## 2018-04-23 RX ORDER — FENTANYL CITRATE 50 UG/ML
INJECTION, SOLUTION INTRAMUSCULAR; INTRAVENOUS AS NEEDED
Status: DISCONTINUED | OUTPATIENT
Start: 2018-04-23 | End: 2018-04-23 | Stop reason: HOSPADM

## 2018-04-23 RX ORDER — ONDANSETRON 2 MG/ML
4 INJECTION INTRAMUSCULAR; INTRAVENOUS AS NEEDED
Status: DISCONTINUED | OUTPATIENT
Start: 2018-04-23 | End: 2018-04-23 | Stop reason: HOSPADM

## 2018-04-23 RX ADMIN — Medication 80 MCG: at 13:03

## 2018-04-23 RX ADMIN — SODIUM CHLORIDE, SODIUM LACTATE, POTASSIUM CHLORIDE, AND CALCIUM CHLORIDE 125 ML/HR: 600; 310; 30; 20 INJECTION, SOLUTION INTRAVENOUS at 11:34

## 2018-04-23 RX ADMIN — ROCURONIUM BROMIDE 35 MG: 10 INJECTION, SOLUTION INTRAVENOUS at 12:19

## 2018-04-23 RX ADMIN — SODIUM CHLORIDE, SODIUM LACTATE, POTASSIUM CHLORIDE, CALCIUM CHLORIDE: 600; 310; 30; 20 INJECTION, SOLUTION INTRAVENOUS at 12:03

## 2018-04-23 RX ADMIN — KETOROLAC TROMETHAMINE 15 MG: 30 INJECTION, SOLUTION INTRAMUSCULAR; INTRAVENOUS at 13:45

## 2018-04-23 RX ADMIN — GLYCOPYRROLATE 0.2 MG: 0.2 INJECTION INTRAMUSCULAR; INTRAVENOUS at 12:26

## 2018-04-23 RX ADMIN — SUCCINYLCHOLINE CHLORIDE 120 MG: 20 INJECTION INTRAMUSCULAR; INTRAVENOUS at 12:09

## 2018-04-23 RX ADMIN — GLYCOPYRROLATE 0.2 MG: 0.2 INJECTION INTRAMUSCULAR; INTRAVENOUS at 12:29

## 2018-04-23 RX ADMIN — Medication 80 MCG: at 13:30

## 2018-04-23 RX ADMIN — FENTANYL CITRATE 100 MCG: 50 INJECTION, SOLUTION INTRAMUSCULAR; INTRAVENOUS at 12:09

## 2018-04-23 RX ADMIN — MIDAZOLAM HYDROCHLORIDE 2 MG: 1 INJECTION, SOLUTION INTRAMUSCULAR; INTRAVENOUS at 12:03

## 2018-04-23 RX ADMIN — Medication 80 MCG: at 12:48

## 2018-04-23 RX ADMIN — EPHEDRINE SULFATE 5 MG: 50 INJECTION, SOLUTION INTRAVENOUS at 12:36

## 2018-04-23 RX ADMIN — ONDANSETRON 4 MG: 2 INJECTION INTRAMUSCULAR; INTRAVENOUS at 13:41

## 2018-04-23 RX ADMIN — DEXAMETHASONE SODIUM PHOSPHATE 4 MG: 4 INJECTION, SOLUTION INTRA-ARTICULAR; INTRALESIONAL; INTRAMUSCULAR; INTRAVENOUS; SOFT TISSUE at 12:26

## 2018-04-23 RX ADMIN — HYDROMORPHONE HYDROCHLORIDE 0.5 MG: 2 INJECTION, SOLUTION INTRAMUSCULAR; INTRAVENOUS; SUBCUTANEOUS at 13:07

## 2018-04-23 RX ADMIN — Medication 80 MCG: at 12:36

## 2018-04-23 RX ADMIN — Medication 80 MCG: at 13:26

## 2018-04-23 RX ADMIN — KETOROLAC TROMETHAMINE 15 MG: 30 INJECTION, SOLUTION INTRAMUSCULAR at 19:38

## 2018-04-23 RX ADMIN — SODIUM CHLORIDE, SODIUM LACTATE, POTASSIUM CHLORIDE, AND CALCIUM CHLORIDE 125 ML/HR: 600; 310; 30; 20 INJECTION, SOLUTION INTRAVENOUS at 23:11

## 2018-04-23 RX ADMIN — LIDOCAINE HYDROCHLORIDE 100 MG: 20 INJECTION, SOLUTION EPIDURAL; INFILTRATION; INTRACAUDAL; PERINEURAL at 12:09

## 2018-04-23 RX ADMIN — PROPOFOL 180 MG: 10 INJECTION, EMULSION INTRAVENOUS at 12:09

## 2018-04-23 RX ADMIN — ROCURONIUM BROMIDE 5 MG: 10 INJECTION, SOLUTION INTRAVENOUS at 12:09

## 2018-04-23 RX ADMIN — SODIUM CHLORIDE, SODIUM LACTATE, POTASSIUM CHLORIDE, AND CALCIUM CHLORIDE 125 ML/HR: 600; 310; 30; 20 INJECTION, SOLUTION INTRAVENOUS at 14:37

## 2018-04-23 RX ADMIN — CEFOTETAN DISODIUM 2 G: 2 INJECTION, POWDER, FOR SOLUTION INTRAMUSCULAR; INTRAVENOUS at 23:11

## 2018-04-23 RX ADMIN — ESCITALOPRAM OXALATE 20 MG: 10 TABLET ORAL at 21:30

## 2018-04-23 NOTE — PERIOP NOTES
Patient: Camellia Schlatter MRN: [id number]  SSN: [id number]   YOB: 1955  Age: 61 y.o. Sex: female     Patient is status post Procedure(s):  LAPAROSCOPIC SMALL BOWEL RESECTION. Surgeon(s) and Role:     * Mary Ann Figueroa MD - Primary    Local/Dose/Irrigation:  30ML OF 0.5% [place]IN                  Peripheral IV 04/23/18 Left Arm (Active)   Site Assessment Clean, dry, & intact; Ecchymotic (bruised) 4/23/2018 11:32 AM   Phlebitis Assessment 0 4/23/2018 11:32 AM   Infiltration Assessment 0 4/23/2018 11:32 AM   Dressing Status Clean, dry, & intact; New 4/23/2018 11:32 AM   Dressing Type Tape;Transparent 4/23/2018 11:32 AM   Hub Color/Line Status Pink 4/23/2018 11:32 AM            Airway - Endotracheal Tube 04/23/18 Oral (Active)                   Dressing/Packing:  Wound Abdomen-DRESSING TYPE: Topical skin adhesive/glue; Other (Comment) (EXOFIN) (04/23/18 1300)  Splint/Cast:  ]    Other:

## 2018-04-23 NOTE — PERIOP NOTES
TRANSFER - OUT REPORT:    Verbal report given to 1 [facility] on [facility]  being transferred to room 506 for routine post - op       Report consisted of patients Situation, Background, Assessment and   Recommendations(SBAR). Time Pre op antibiotic given:2 gram cefotetan  Anesthesia Stop time: 7005  [place] Present on Transfer to floor:no  Order for [place] on Chart:no  Discharge Prescriptions with Chart:no    Information from the following report(s) SBAR, OR Summary, Intake/Output and MAR was reviewed with the receiving nurse. Opportunity for questions and clarification was provided. Is the patient on 02? YES       L/Min 3       Other     Is the patient on a monitor? NO    Is the nurse transporting with the patient? NO    Surgical Waiting Area notified of patient's transfer from PACU? YES      The following personal items collected during your admission accompanied patient upon transfer:   Dental Appliance: Dental Appliances: None  Vision: Visual Aid: Glasses  Hearing Aid:    Jewelry: Jewelry: None  Clothing: Clothing: Other (comment) (CLOTHING & SHOES TO PACU)  Other Valuables:  Other Valuables: Eyeglasses (GLASSES TO PACU)  Valuables sent to safe:

## 2018-04-23 NOTE — ANESTHESIA PREPROCEDURE EVALUATION
Anesthetic History   No history of anesthetic complications            Review of Systems / Medical History  Patient summary reviewed, nursing notes reviewed and pertinent labs reviewed    Pulmonary            Asthma : well controlled       Neuro/Psych         Psychiatric history     Cardiovascular  Within defined limits                Exercise tolerance: >4 METS     GI/Hepatic/Renal  Within defined limits             Comments: J-J intususseption w/partial SBO Endo/Other      Hypothyroidism: well controlled  Morbid obesity and arthritis     Other Findings   Comments: Miniere's  Intussusception of jejunum ([facility] [address] 04/23/2018    Pulmonary embolism (HCC)     RA (rheumatoid arthritis) ([facility] [address] [facility]     Thyroid disease            Physical Exam    Airway  Mallampati: II  TM Distance: > 6 cm  Neck ROM: normal range of motion   Mouth opening: Normal     Cardiovascular  Regular rate and rhythm,  S1 and S2 normal,  no murmur, click, rub, or gallop             Dental    Dentition: Upper dentition intact and Lower dentition intact     Pulmonary  Breath sounds clear to auscultation               Abdominal  GI exam deferred       Other Findings            Anesthetic Plan    ASA: 3  Anesthesia type: general          Induction: Intravenous  Anesthetic plan and risks discussed with: Patient

## 2018-04-23 NOTE — IP AVS SNAPSHOT
2700 [facility] [address] [place] [address] 
[phone] Patient: Randy Steele MRN: [id number] SFA:[birth year] About your hospitalization You were admitted on:  April 23, 2018 You last received care in the:  [place] [address] You were discharged on:  April 25, 2018 Why you were hospitalized Your primary diagnosis was: Intussusception Of Jejunum (Hcc) Your diagnoses also included:  Periumbilical Abdominal Pain Follow-up Information Follow up With Details Comments Contact Info Mahamed Sinclair NP Go on 5/8/2018 Follow-up appointment with Lance Stock on Tuesday, May 8 at 10 am for post-op check 200 [facility]  [address] [place] [address] 
[phone] Ale Soriano MD   [address] [place] [address] [address] [place] [address] 
[phone] Your Scheduled Appointments Tuesday May 08, 2018 10:00 AM EDT  
POST OP 10 MIN with Mahamed Sinclair NP  
[facility] 22 962 ([facility]) [address] [place] [phone]  
[phone] Friday May 25, 2018 MASS EXCISION TRUNK with Skinny Coates MD  
[facility] SURGERY (RI OR PRE ASSESSMENT) [address]  
[phone] Friday June 08, 2018 10:20 AM EDT  
POST OP with Mahamed Sinclair NP  
[facility] 22 722 ([facility]) [address] [place] [address] [phone] Discharge Orders None A check saadia indicates which time of day the medication should be taken. My Medications CHANGE how you take these medications Instructions Each Dose to Equal  
 Morning Noon Evening Bedtime  
 escitalopram oxalate 20 mg tablet Commonly known as:  Chacorta Huang What changed:  when to take this Your last dose was: Your next dose is: Take 1 Tab by mouth daily. Indications: ANXIETY WITH DEPRESSION  
 20 mg CONTINUE taking these medications Instructions Each Dose to Equal  
 Morning Noon Evening Bedtime  
 albuterol 90 mcg/actuation inhaler Commonly known as:  PROAIR HFA Your last dose was: Your next dose is: Take 2 Puffs by inhalation every six (6) hours as needed for Wheezing. Indications: Acute Asthma Attack 2 Puff ALPRAZolam 0.5 mg tablet Commonly known as:  Meño Leonardo Your last dose was: Your next dose is: Take 1 Tab by mouth three (3) times daily as needed for Sleep or Anxiety. Indications: ANXIETY  
 0.5 mg  
    
   
   
   
  
 aspirin delayed-release 81 mg tablet Your last dose was: Your next dose is: Take  by mouth daily. fexofenadine 180 mg tablet Commonly known as:  Sasha Avila Your last dose was: Your next dose is: Take 180 mg by mouth nightly. 180 mg  
    
   
   
   
  
 folic acid 1 mg tablet Commonly known as:  Google Your last dose was: Your next dose is: Take 4 mg by mouth daily. 4 mg  
    
   
   
   
  
 furosemide 40 mg tablet Commonly known as:  LASIX Your last dose was: Your next dose is: Take 1 Tab by mouth daily. 40 mg  
    
   
   
   
  
 leucovorin calcium 5 mg tablet Commonly known as:  Chris Pond Your last dose was: Your next dose is: Take  by mouth every seven (7) days. levothyroxine 125 mcg tablet Commonly known as:  SYNTHROID Your last dose was: Your next dose is: TAKE ONE TABLET BY MOUTH EVERY DAY BEFORE BREAKFAST - LAB WORK &FOLLOW UP APPOINTMENT  
     
   
   
   
  
 methotrexate 2.5 mg tablet Commonly known as:  Bird Lambert Your last dose was: Your next dose is: Take 8 Tabs by mouth every Sunday. 8 Tab PRESERVISION AREDS 2 PO Your last dose was: Your next dose is: Take  by mouth. REMICADE 100 mg injection Generic drug:  inFLIXimab Your last dose was: Your next dose is:    
   
   
 5 mg/kg by IntraVENous route once. Lemon Matar 5 mg/kg  
    
   
   
   
  
 traMADol 50 mg tablet Commonly known as:  ULTRAM  
   
Your last dose was: Your next dose is:    
   
   
 50 mg nightly as needed. 50 mg  
    
   
   
   
  
 TYLENOL EXTRA STRENGTH 500 mg tablet Generic drug:  acetaminophen Your last dose was: Your next dose is: Take 1,000 mg by mouth every six (6) hours as needed for Pain. Indications: Pain  
 1000 mg Opioid Education Prescription Opioids: What You Need to Know: 
 
Prescription opioids can be used to help relieve moderate-to-severe pain and are often prescribed following a surgery or injury, or for certain health conditions. These medications can be an important part of treatment but also come with serious risks. Opioids are strong pain medicines. Examples include hydrocodone, oxycodone, fentanyl, and morphine. Heroin is an example of an illegal opioid. It is important to work with your health care provider to make sure you are getting the safest, most effective care. WHAT ARE THE RISKS AND SIDE EFFECTS OF OPIOID USE? Prescription opioids carry serious risks of addiction and overdose, especially with prolonged use. An opioid overdose, often marked by slow breathing, can cause sudden death. The use of prescription opioids can have a number of side effects as well, even when taken as directed. · Tolerance-meaning you might need to take more of a medication for the same pain relief · Physical dependence-meaning you have symptoms of withdrawal when the medication is stopped. Withdrawal symptoms can include nausea, sweating, chills, diarrhea, stomach cramps, and muscle aches. Withdrawal can last up to several weeks, depending on which drug you took and how long you took it. · Increased sensitivity to pain · Constipation · Nausea, vomiting, and dry mouth · Sleepiness and dizziness · Confusion · Depression · Low levels of testosterone that can result in lower sex drive, energy, and strength · Itching and sweating RISKS ARE GREATER WITH:      
· History of drug misuse, substance use disorder, or overdose · Mental health conditions (such as depression or anxiety) · Sleep apnea · Older age (72 years or older) · Pregnancy Avoid alcohol while taking prescription opioids. Also, unless specifically advised by your health care provider, medications to avoid include: · Benzodiazepines (such as Xanax or Valium) · Muscle relaxants (such as Soma or Flexeril) · Hypnotics (such as Ambien or Lunesta) · Other prescription opioids KNOW YOUR OPTIONS Talk to your health care provider about ways to manage your pain that don't involve prescription opioids. Some of these options may actually work better and have fewer risks and side effects. Options may include: 
· Pain relievers such as acetaminophen, ibuprofen, and naproxen · Some medications that are also used for depression or seizures · Physical therapy and exercise · Counseling to help patients learn how to cope better with triggers of pain and stress. · Application of heat or cold compress · Massage therapy · Relaxation techniques Be Informed Make sure you know the name of your medication, how much and how often to take it, and its potential risks & side effects. IF YOU ARE PRESCRIBED OPIOIDS FOR PAIN: 
· Never take opioids in greater amounts or more often than prescribed. Remember the goal is not to be pain-free but to manage your pain at a tolerable level. · Follow up with your primary care provider to: · Work together to create a plan on how to manage your pain. · Talk about ways to help manage your pain that don't involve prescription opioids. · Talk about any and all concerns and side effects. · Help prevent misuse and abuse. · Never sell or share prescription opioids · Help prevent misuse and abuse. · Store prescription opioids in a secure place and out of reach of others (this may include visitors, children, friends, and family). · Safely dispose of unused/unwanted prescription opioids: Find your community drug take-back program or your pharmacy mail-back program, or flush them down the toilet, following guidance from the Food and Drug Administration (www.fda.gov/Drugs/ResourcesForYou). · Visit www.cdc.gov/drugoverdose to learn about the risks of opioid abuse and overdose. · If you believe you may be struggling with addiction, tell your health care provider and ask for guidance or call [facility] at 0-837-793-MKVH. Discharge Instructions Future Appointments Date Time Provider Charu Thorpe 6/8/2018 10:20 AM RUDY Castellanos Post-op appointment scheduled for Tuesday, May 8, 2018 at 10 am with Carlos Olivo NP. Call office at [phone] if you need to make changes to this appointment. Laparoscopic Bowel Resection: What to Expect at Home Your Recovery You have had part of your small or large intestine taken out. You are likely to have pain that comes and goes for the next few days. You may feel like you have the flu. You also may have a low fever and feel tired and nauseated. This is common. You should feel better after 1 to 2 weeks and will probably be back to normal in 2 to 4 weeks. Your bowel movements may not be regular for several weeks. Also, you may have some blood in your stool. This care sheet gives you a general idea about how long it will take for you to recover. But each person recovers at a different pace. Follow the steps below to get better as quickly as possible. How can you care for yourself at home? Activity ? · Rest when you feel tired. Getting enough sleep will help you recover. ? · Try to walk each day. Start by walking a little more than you did the day before. Bit by bit, increase the amount you walk. Walking boosts blood flow and helps prevent pneumonia and constipation. ? · Avoid strenuous activities, such as biking, jogging, weight lifting, or aerobic exercise, until your doctor says it is okay. ? · Avoid lifting anything that would make you strain. This may include heavy grocery bags and milk containers, a heavy briefcase or backpack, cat litter or dog food bags, a vacuum , or a child. ? · Ask your doctor when you can drive again. ? · You will probably need to take 2 to 4 weeks off from work. It depends on the type of work you do and how you feel. ? · You may shower 48 hours after surgery. Pat the cuts (incisions) dry. Do not take a bath for the first 2 weeks, or until your doctor tells you it is okay. ? · Ask your doctor when it is okay for you to have sex. Diet ? · You may not have much appetite after the surgery. But try to eat a healthy diet. Your doctor will tell you about any foods you should not eat. ? · Eat a low-fiber diet for several weeks after surgery. Eat many small meals throughout the day. Add high-fiber foods a little at a time. ? · Eat yogurt. It puts good bacteria into your colon and helps prevent diarrhea. ? · Try to avoid nuts, seeds, and corn for a while. They may be hard to digest.  
? · You may need to take vitamins that contain sodium and potassium. Your doctor will tell you whether you should take any vitamins or supplements.   
? · Drink plenty of fluids (enough so that your urine is light yellow or clear like water) to prevent dehydration. Choose water and other caffeine-free clear liquids until you feel better. If you have kidney, heart, or liver disease and have to limit fluids, talk with your doctor before you increase the amount of fluids you drink. Medicines ? · Your doctor will tell you if and when you can restart your medicines. He or she will also give you instructions about taking any new medicines. ? · If you take blood thinners, such as warfarin (Coumadin), clopidogrel (Plavix), or aspirin, be sure to talk to your doctor. He or she will tell you if and when to start taking those medicines again. Make sure that you understand exactly what your doctor wants you to do. ? · Take pain medicines exactly as directed. ¨ If the doctor gave you a prescription medicine for pain, take it as prescribed. ¨ If you are not taking a prescription pain medicine, ask your doctor if you can take an over-the-counter medicine. ? · If your doctor prescribed antibiotics, take them as directed. Do not stop taking them just because you feel better. You need to take the full course of antibiotics. ? · You may need to take some medicines in a different form. You will be told whether to crush pills or take a liquid form of the medicine. ? · If your doctor gives you a stool softener, take it as directed. Incision care ? · If you have strips of tape on the incisions, leave the tape on for a week or until it falls off.  
? · Wash the area daily with warm, soapy water and pat it dry. Don't use hydrogen peroxide or alcohol, which can slow healing. You may cover the area with a gauze bandage if it weeps or rubs against clothing. Change the bandage every day. Follow-up care is a key part of your treatment and safety. Be sure to make and go to all appointments, and call your doctor if you are having problems. It's also a good idea to know your test results and keep a list of the medicines you take. When should you call for help? Call 911 anytime you think you may need emergency care. For example, call if: 
? · You passed out (lost consciousness). ? · You are short of breath. ?Call your doctor now or seek immediate medical care if: 
? · You have pain that does not get better after you take pain medicine. ? · You cannot pass stool or gas. ? · You are sick to your stomach and cannot keep fluids down. ? · Bright red blood has soaked through your bandage. ? · You have loose stitches, or your incision comes open. ? · You have signs of a blood clot in your leg (called a deep vein thrombosis), such as: 
¨ Pain in your calf, back of the knee, thigh, or groin. ¨ Redness and swelling in your leg or groin. ? · You have signs of infection, such as: 
¨ Increased pain, swelling, warmth, or redness. ¨ Red streaks leading from the incision. ¨ Pus draining from the incision. ¨ A fever. ? Watch closely for any changes in your health, and be sure to contact your doctor if you have any problems. Where can you learn more? Go to http://theresa-sandra.info/. Enter H191 in the search box to learn more about \"Laparoscopic Bowel Resection: What to Expect at Home. \" Current as of: May 12, 2017 Content Version: 11.4 © 5766-9929 [employer]. Care instructions adapted under license by [employer] (which disclaims liability or warranty for this information). If you have questions about a medical condition or this instruction, always ask your healthcare professional. [place] [address] any warranty or liability for your use of this information. Birdi Announcement We are excited to announce that we are making your provider's discharge notes available to you in Birdi.   You will see these notes when they are completed and signed by the physician that discharged you from your recent hospital stay. If you have any questions or concerns about any information you see in Moneythink, please call the Health Information Department where you were seen or reach out to your Primary Care Provider for more information about your plan of care. Introducing [facility]! Dear Jayda Desai: Thank you for requesting a Moneythink account. Our records indicate that you already have an active Moneythink account. You can access your account anytime at [email or web address] Did you know that you can access your hospital and ER discharge instructions at any time in Moneythink? You can also review all of your test results from your hospital stay or ER visit. Additional Information If you have questions, please visit the Frequently Asked Questions section of the Moneythink website at [email or web address] Remember, Moneythink is NOT to be used for urgent needs. For medical emergencies, dial 911. Now available from your iPhone and Android! Introducing Kulwant Torres As a Kriste Peek patient, I wanted to make you aware of our electronic visit tool called Kulwant Joebriana. Alejandrae Peek 24/7 allows you to connect within minutes with a medical provider 24 hours a day, seven days a week via a mobile device or tablet or logging into a secure website from your computer. You can access Kulwant Torres from anywhere in the [place]. A virtual visit might be right for you when you have a simple condition and feel like you just dont want to get out of bed, or cant get away from work for an appointment, when your regular Kristkourtney Novoaek provider is not available (evenings, weekends or holidays), or when youre out of town and need minor care. Electronic visits cost only $49 and if the Adolphiste Peek 24/7 provider determines a prescription is needed to treat your condition, one can be electronically transmitted to a nearby pharmacy*. Please take a moment to enroll today if you have not already done so. The enrollment process is free and takes just a few minutes. To enroll, please download the Doyne Sprout 24/7 yakelin to your tablet or phone, or visit [email or web address] to enroll on your computer. And, as an [address] [place] [address] patient with a RisparmioSuper account, the results of your visits will be scanned into your electronic medical record and your primary care provider will be able to view the scanned results. We urge you to continue to see your regular [facility] provider for your ongoing medical care. And while your primary care provider may not be the one available when you seek a Tristar virtual visit, the peace of mind you get from getting a real diagnosis real time can be priceless. For more information on Tristar, view our Frequently Asked Questions (FAQs) at [email or web address]. Sincerely, 
 
Sumi Cano MD 
Chief Medical Officer [facility] Yoko Padilla *:  certain medications cannot be prescribed via Tristar Providers Seen During Your Hospitalization Provider Specialty Primary office phone Nataliya Lopez MD General Surgery [phone] Your Primary Care Physician (PCP) Primary Care Physician Office Phone Office Fax Adonay Spivey [address], [place] [phone] You are allergic to the following No active allergies Recent Documentation Height Weight BMI OB Status Smoking Status 1.778 m 129.4 kg 40.95 kg/m2 Postmenopausal Never Smoker Emergency Contacts Name Discharge Info Relation Home Work Mobile Yoel Matt DISCHARGE CAREGIVER [3] Spouse [3] [phone] Patient Belongings  The following personal items are in your possession at time of discharge: 
  Dental Appliances: None  Visual Aid: Glasses, With patient      Home Medications: None   Jewelry: None  Clothing: Other (comment) (CLOTHING & SHOES TO PACU)    Other Valuables: Eyeglasses (GLASSES TO PACU) Please provide this summary of care documentation to your next provider. Signatures-by signing, you are acknowledging that this After Visit Summary has been reviewed with you and you have received a copy. Patient Signature:  ____________________________________________________________ Date:  ____________________________________________________________  
  
Julianne Pane Provider Signature:  ____________________________________________________________ Date:  ____________________________________________________________

## 2018-04-23 NOTE — BRIEF OP NOTE
BRIEF OPERATIVE NOTE    Date of Procedure: 4/23/2018   Preoperative Diagnosis: PARTIAL SMALL BOWEL OBSTRUCTION SECONDARY TO JJ INTUSSUSCEPTION  Postoperative Diagnosis: PARTIAL SMALL BOWEL OBSTRUCTION  SECONDARY TO JJ INTUSSUSCEPTION  Procedure(s):  LAPAROSCOPIC SMALL BOWEL RESECTION  Surgeon(s) and Role:     * Antoinette Shah MD - Primary         Surgical Assistant: Melissa Spencer    Surgical Staff:  Circ-1: Shelton Ludwig  Circ-Relief: Ailyn Adkins RN  Scrub Tech-1: Barbie Mauro  Surg Asst-1: Teryr Jones Time In   Incision Start 1227   Incision Close 1351     Anesthesia: General   Estimated Blood Loss: 30 cc  Specimens:   ID Type Source Tests Collected by Time Destination   1 : SEGMENT OF SMALL INTESTINE Fresh Small Bowel  Antoinette Shah MD 4/23/2018 1339 Pathology      Findings: Kileyreji Galeazzi partial obstruction, resected/reconstructed   Complications: none  Implants: * No implants in log *

## 2018-04-23 NOTE — H&P
Subjective:      Kayla Escobedo is a 61 y.o. female with the complaint of abdominal pain. The pain is located in the periumbilical area without radiation. Pain is described as cramping and measures 6/10 in intensity. Onset of pain was 3 weeks ago. Aggravating factors include eating. Alleviating factors include light/liquid diet. Associated symptoms include nausea and loose stools; no hematemesis, melena, dysuria, wheezing, or chest pain. She has history of laparoscopic gastric bypass. Patient Active Problem List    Diagnosis Date Noted    Intussusception of jejunum ([address] 72/83/8682    Periumbilical abdominal pain 04/10/2018    Abdominal bloating 04/10/2018    Left flank mass 03/13/2018    Obesity, morbid ([address] 02/28/2018    Depression 02/22/2017    Asthma     Left atrial enlargement     Meniere's disease     Pulmonary embolism (HCC)     RA (rheumatoid arthritis) ([address]     Thyroid disease     Vitamin D deficiency     Anxiety state 06/23/2010     Past Medical History:   Diagnosis Date    Adverse effect of anesthesia     HARD TO WAKE UP FROM THE SURGERY     Anxiety state, unspecified 6/23/2010    Asthma     Depression     DVT (deep venous thrombosis) ([address] 03/1997    LT DVT    Left atrial enlargement     RIGHT ATR. ENL.     Meniere's disease     Osteoarthritis     Psychiatric disorder      PANIC ATTACK     Pulmonary embolism ([address] 03/1997    MULTIPLE BILA     Thyroid disease     HYPO    Unspecified asthma(493.90) 6/23/2010    Vitamin D deficiency       Past Surgical History:   Procedure Laterality Date    ABDOMEN SURGERY PROC UNLISTED      ENDOMETRIAL CRYOABLATION  1996    GASTRIC BYPASS,OBESE<150CM TONIA-EN-Y      HX AMPUTATION      RIGHT FOOT 4 TH TOE    HX APPENDECTOMY      HX BREAST BIOPSY  1998     RT BREAST ,  BENIIGN    HX CERVICAL LAMINECTOMY  07/02/2015    C5-C6    HX CHOLECYSTECTOMY      HX COLONOSCOPY  05/06/2016    Dr. Faye Yeager - 8 yr f/u    HX GASTRIC BYPASS  2002    HX GYN      ABLATION     HX GYN      HX KNEE ARTHROSCOPY      RIGHT     HX ORTHOPAEDIC      REMOVAL COCCYX    HX TONSILLECTOMY      REMOVAL OF COCCYX        Social History   Substance Use Topics    Smoking status: Never Smoker    Smokeless tobacco: Never Used    Alcohol use No      Family History   Problem Relation Age of Onset    Diabetes Mother     Stroke Mother      TIA    Heart Disease Mother     Hypertension Mother     Heart Disease Father     Diabetes Father     Lung Disease Father      COPD    Kidney Disease Father      STAGE 3    Anesth Problems Neg Hx       No current facility-administered medications for this encounter. No Known Allergies    Review of Systems:    A complete review of systems was negative except as noted in the HPI. Objective: There were no vitals taken for this visit. Physical Exam:  GENERAL: alert, cooperative, no distress, appears stated age, morbidly obese, EYE: negative, THROAT & NECK: normal, LUNG: clear to auscultation bilaterally, HEART: regular rate and rhythm, S1, S2 normal, no murmur. ABDOMEN: Obese, NABS, slightly distended; diffuse mild pain with palpation; no mass or hernia. EXTREMITIES:  extremities normal, atraumatic, no cyanosis or edema, SKIN: Normal., NEUROLOGIC: negative    Imaging:  reviewed  CT    Assessment:     Abdominal pain with small bowel intussusception at jejuno-jejunostomy (partially obstructing). Plan:     1. I recommend proceeding with laparoscopic small bowel resection with JJ reconstruction. 2. Discussed aspects of surgical intervention, methods, risks (including by not limited to infection, bleeding, hematoma, conversion to open procedure, recurrence, staple line leak, and perforation of the intestines or solid organs), and the risks of general anesthetic. The patient understands the risks; all questions were answered to the patient's satisfaction. 3. Patient does wish to proceed with surgery. Signed By: Misael Quintero MD     April 23, 2018

## 2018-04-24 LAB
ANION GAP SERPL CALC-SCNC: 7 MMOL/L (ref 5–15)
BASOPHILS # BLD: 0.1 K/UL (ref 0–0.1)
BASOPHILS NFR BLD: 1 % (ref 0–1)
BUN SERPL-MCNC: 12 MG/DL (ref 6–20)
BUN/CREAT SERPL: 14 (ref 12–20)
CALCIUM SERPL-MCNC: 7.9 MG/DL (ref 8.5–10.1)
CHLORIDE SERPL-SCNC: 107 MMOL/L (ref 97–108)
CO2 SERPL-SCNC: 27 MMOL/L (ref 21–32)
CREAT SERPL-MCNC: 0.84 MG/DL (ref 0.55–1.02)
DIFFERENTIAL METHOD BLD: ABNORMAL
EOSINOPHIL # BLD: 0 K/UL (ref 0–0.4)
EOSINOPHIL NFR BLD: 1 % (ref 0–7)
ERYTHROCYTE [DISTWIDTH] IN BLOOD BY AUTOMATED COUNT: 16.3 % (ref 11.5–14.5)
GLUCOSE SERPL-MCNC: 104 MG/DL (ref 65–100)
HCT VFR BLD AUTO: 32.8 % (ref 35–47)
HGB BLD-MCNC: 10.4 G/DL (ref 11.5–16)
IMM GRANULOCYTES # BLD: 0 K/UL (ref 0–0.04)
IMM GRANULOCYTES NFR BLD AUTO: 0 % (ref 0–0.5)
LYMPHOCYTES # BLD: 1.3 K/UL (ref 0.8–3.5)
LYMPHOCYTES NFR BLD: 18 % (ref 12–49)
MCH RBC QN AUTO: 28 PG (ref 26–34)
MCHC RBC AUTO-ENTMCNC: 31.7 G/DL (ref 30–36.5)
MCV RBC AUTO: 88.2 FL (ref 80–99)
MONOCYTES # BLD: 0.5 K/UL (ref 0–1)
MONOCYTES NFR BLD: 7 % (ref 5–13)
NEUTS SEG # BLD: 5.4 K/UL (ref 1.8–8)
NEUTS SEG NFR BLD: 74 % (ref 32–75)
NRBC # BLD: 0 K/UL (ref 0–0.01)
NRBC BLD-RTO: 0 PER 100 WBC
PLATELET # BLD AUTO: 203 K/UL (ref 150–400)
PMV BLD AUTO: 12 FL (ref 8.9–12.9)
POTASSIUM SERPL-SCNC: 4.2 MMOL/L (ref 3.5–5.1)
RBC # BLD AUTO: 3.72 M/UL (ref 3.8–5.2)
SODIUM SERPL-SCNC: 141 MMOL/L (ref 136–145)
WBC # BLD AUTO: 7.3 K/UL (ref 3.6–11)

## 2018-04-24 PROCEDURE — 85025 COMPLETE CBC W/AUTO DIFF WBC: CPT | Performed by: SURGERY

## 2018-04-24 PROCEDURE — 74011250636 HC RX REV CODE- 250/636: Performed by: SURGERY

## 2018-04-24 PROCEDURE — 36415 COLL VENOUS BLD VENIPUNCTURE: CPT | Performed by: SURGERY

## 2018-04-24 PROCEDURE — 74011250637 HC RX REV CODE- 250/637: Performed by: SURGERY

## 2018-04-24 PROCEDURE — 74011250637 HC RX REV CODE- 250/637: Performed by: NURSE PRACTITIONER

## 2018-04-24 PROCEDURE — 65270000032 HC RM SEMIPRIVATE

## 2018-04-24 PROCEDURE — 80048 BASIC METABOLIC PNL TOTAL CA: CPT | Performed by: SURGERY

## 2018-04-24 RX ORDER — TRAMADOL HYDROCHLORIDE 50 MG/1
50 TABLET ORAL
Status: DISCONTINUED | OUTPATIENT
Start: 2018-04-24 | End: 2018-04-25 | Stop reason: HOSPADM

## 2018-04-24 RX ORDER — ASPIRIN 81 MG/1
81 TABLET ORAL DAILY
Status: DISCONTINUED | OUTPATIENT
Start: 2018-04-24 | End: 2018-04-25 | Stop reason: HOSPADM

## 2018-04-24 RX ORDER — LORATADINE 10 MG/1
10 TABLET ORAL DAILY
Status: DISCONTINUED | OUTPATIENT
Start: 2018-04-24 | End: 2018-04-24

## 2018-04-24 RX ORDER — LEVOTHYROXINE SODIUM 125 UG/1
125 TABLET ORAL
Status: DISCONTINUED | OUTPATIENT
Start: 2018-04-24 | End: 2018-04-25 | Stop reason: HOSPADM

## 2018-04-24 RX ORDER — ESCITALOPRAM OXALATE 10 MG/1
20 TABLET ORAL
Status: DISCONTINUED | OUTPATIENT
Start: 2018-04-24 | End: 2018-04-24 | Stop reason: SDUPTHER

## 2018-04-24 RX ORDER — ACETAMINOPHEN 325 MG/1
650 TABLET ORAL
Status: DISCONTINUED | OUTPATIENT
Start: 2018-04-24 | End: 2018-04-25 | Stop reason: HOSPADM

## 2018-04-24 RX ORDER — MINERAL OIL
180 ENEMA (ML) RECTAL DAILY
Status: DISCONTINUED | OUTPATIENT
Start: 2018-04-24 | End: 2018-04-25 | Stop reason: HOSPADM

## 2018-04-24 RX ADMIN — Medication 10 ML: at 14:46

## 2018-04-24 RX ADMIN — Medication 10 ML: at 22:20

## 2018-04-24 RX ADMIN — KETOROLAC TROMETHAMINE 15 MG: 30 INJECTION, SOLUTION INTRAMUSCULAR at 03:08

## 2018-04-24 RX ADMIN — ESCITALOPRAM OXALATE 20 MG: 10 TABLET ORAL at 21:46

## 2018-04-24 RX ADMIN — ENOXAPARIN SODIUM 40 MG: 100 INJECTION SUBCUTANEOUS at 09:59

## 2018-04-24 RX ADMIN — SODIUM CHLORIDE, SODIUM LACTATE, POTASSIUM CHLORIDE, AND CALCIUM CHLORIDE 125 ML/HR: 600; 310; 30; 20 INJECTION, SOLUTION INTRAVENOUS at 16:45

## 2018-04-24 RX ADMIN — LEVOTHYROXINE SODIUM 125 MCG: 125 TABLET ORAL at 09:59

## 2018-04-24 RX ADMIN — TRAMADOL HYDROCHLORIDE 50 MG: 50 TABLET, FILM COATED ORAL at 21:50

## 2018-04-24 RX ADMIN — ASPIRIN 81 MG: 81 TABLET, COATED ORAL at 09:59

## 2018-04-24 RX ADMIN — Medication 180 MG: at 23:00

## 2018-04-24 RX ADMIN — SODIUM CHLORIDE, SODIUM LACTATE, POTASSIUM CHLORIDE, AND CALCIUM CHLORIDE 125 ML/HR: 600; 310; 30; 20 INJECTION, SOLUTION INTRAVENOUS at 07:27

## 2018-04-24 RX ADMIN — ACETAMINOPHEN 650 MG: 325 TABLET, FILM COATED ORAL at 10:43

## 2018-04-24 RX ADMIN — KETOROLAC TROMETHAMINE 15 MG: 30 INJECTION, SOLUTION INTRAMUSCULAR at 07:23

## 2018-04-24 RX ADMIN — Medication 10 ML: at 07:24

## 2018-04-24 NOTE — PROGRESS NOTES
Spiritual Care Partner Volunteer visited patient in [address] [facility] on 4/24/18. Documented by:   Chaplain Baird MDiv, MACE  [phone] PRAY ([phone])

## 2018-04-24 NOTE — PROGRESS NOTES
Pt walked one lap around [address] [facility] and tolerated well. Getting incentive spirometry up to 2200.

## 2018-04-24 NOTE — PROGRESS NOTES
Bedside shift change report given to Stefani Bolton (oncoming nurse) by Xenia Elam (offgoing nurse). Report included the following information SBAR, Kardex, Intake/Output and MAR.

## 2018-04-24 NOTE — OP NOTES
[address] [facility]  MR#: [id number]  : 1955  ACCOUNT #: [de-identified]   DATE OF SERVICE: 2018    ATTENDING SURGEON:   Nicholas Cervantes M.D.    ASSISTANT: Isabella Lincoln SA. PREOPERATIVE DIAGNOSIS:   Partial small-bowel obstruction secondary to intussusception at jejunojejunostomy. POSTOPERATIVE DIAGNOSIS:  Partial small-bowel obstruction secondary to intussusception at jejunojejunostomy. PROCEDURE PERFORMED:  Laparoscopic small bowel resection with jejunojejunostomy reconstruction (CPT T3895842, CPT C617346). ANESTHESIA:  General endotracheal.    DRAINS:  None. COUNTS:  Sponge count correct. Needle count correct. SPECIMEN:  Segment of small intestine inclusive of jejunojejunostomy. ESTIMATED BLOOD LOSS:  30 mL. COMPLICATIONS:  None. IMPLANTS:  None    INDICATIONS:  The patient is a 59-year-old white female with a history of morbid obesity, managed through laparoscopic gastric bypass approximately 15 years ago. She notes a 3 week history of abdominal pain, located in the periumbilical area, with bloating and nausea. CT scan reveals intussusception, partially obstructing, at the jejunojejunostomy. She was taken to the operating today for resection of the jejunojejunostomy with laparoscopic reconstruction. FINDINGS:  Nonobstructing  jejunojejunostomy intussusception, resected with re-creation of the jejunojejunostomy through 2 separate anastomoses, providing a 150 cm Venecia limb. PROCEDURE:  The patient was identified as the correct patient in the preoperative holding area and informed consent was confirmed. After answering the patient's many questions, she was taken to the operating room and placed on the operating room table in the supine position. Sequential compression devices were placed on both lower extremities.   Following the uneventful initiation of general anesthesia, she was carefully secured to the operating room table with a footboard and safety strap in place. All potential pressure points were padded with egg crate. Her abdomen was prepped and draped in the usual sterile fashion. Final timeout was performed, and it was confirmed  she had received intravenous antibiotics. A 5 mm trocar was inserted through a small right upper quadrant skin incision using an Optiview technique. After confirming intraperitoneal location of the trocar tip, insufflation with carbon dioxide gas was initiated. Once adequate working space had been developed, the 5 mm, 30 degree laparoscope was inserted. No signs of trocar entry were present. A moderate amount of visceral fat was identified. A 5 mm trocar was inserted through a small infraumbilical incision using visual guidance with the laparoscope. A left-sided 5 mm trocar was inserted using identical technique. A right upper quadrant 12 mm trocar was inserted using visual guidance with the laparoscope. The omentum was retracted into the upper abdomen, and the retrocolic, retrogastric Venecia limb was identified coming through the mesocolon. There were no signs of internal hernia or patent defect at this site. The bowel was run in anterograde fashion to the jejunojejunostomy where an area of intussusception was identified with the common channel intussusception proximally. No signs of ischemia were present. The area of the mesocolon was reinspected, and the ligament of Treitz was identified posterior to the Venecia limb. The biliopancreatic limb was run in antegrade fashion to the jejunojejunostomy. No adhesions were identified. The common channel was then run in an antegrade fashion to the terminal ileum. No distal pathology was present. The Venecia limb and common channel were marked with doubly placed 0 Surgidac sutures. A singly placed 0 Surgidac suture was placed on the biliopancreatic limb.   The jejunojejunostomy was then resected with 3 firings of a tan load linear stapler placed on the distal biliopancreatic limb, the distal Venecia limb, and the proximal common channel. The remaining small bowel blood supply was controlled with the Harmonic scalpel. The specimen was placed in the left upper quadrant for later retrieval.  The Venecia limb was brought in side-to-side fashion with the common channel. This orientation was maintained with 0 Surgidac stay sutures. The harmonic scalpel was used to make an opening in each segment of bowel, through which a 60 mm tan load cartridge was carefully inserted. After confirming correct insertion and orientation of the stapler, it was fired and removed. The staple line was noted to be hemostatic. The remaining opening in the bowel was closed with a running 2-0 Polysorb suture. A tension relieving, antiobstruction suture was placed at the distal edge of the staple line. The mesenteric defect was closed with a running 0 Surgidac suture. The biliopancreatic limb was then run in anterograde fashion from the ligament of Treitz to the staple line. The Venecia limb was then run from the mesocolic area, measuring 970 cm of bowel. The biliopancreatic limb was brought to the site and secured in position with 0 Surgidac stay sutures. The Harmonic scalpel was used to make an opening in each segment of bowel, through which a 60 mm tan load cartridge was carefully inserted. After confirming correct insertion and orientation of the stapler, it was fired and removed. The staple line was noted to be hemostatic. The remaining opening in the bowel was closed with a running 2-0 Polysorb suture. A tension relieving,   antiobstruction suture was placed at the distal edge of the staple line. The mesenteric defect was closed with a running 0 Surgidac suture. The Venecia limb was then run in anterograde fashion for the mesocolic closure, past the initial anastomosis, to the area of the biliopancreatic anastomosis.   No signs of twisting of the bowel or internal hernia were present. After confirming adequate hemostasis, the right-sided 12 mm fascial defect was closed with a 0 Vicryl suture using a laparoscopic suture passer. Pneumoperitoneum was released, all trocars were removed. All wounds were infiltrated with 0.5% Marcaine without epinephrine. All skin edges were reapproximated with a combination of subcuticular 4-0 Monocryl suture and Dermabond. The patient tolerated the procedure well. She was extubated in the operating room and transported to the recovery area in stable condition. The attending surgeon, Dr. Nica Salgado, was scrubbed and present for the entire procedure.       Grace Brown MD       [address] [facility] Richelle Ruiz  D: 04/23/2018 17:25     T: 04/24/2018 10:36  JOB #: [id number]

## 2018-04-24 NOTE — PROGRESS NOTES
Bedside shift change report given to Corinne (oncoming nurse) by Skyler Banegas (offgoing nurse). Report included the following information SBAR, Kardex and MAR.

## 2018-04-25 VITALS
TEMPERATURE: 98.7 F | WEIGHT: 285.38 LBS | OXYGEN SATURATION: 93 % | BODY MASS INDEX: 40.86 KG/M2 | SYSTOLIC BLOOD PRESSURE: 134 MMHG | DIASTOLIC BLOOD PRESSURE: 73 MMHG | HEIGHT: 70 IN | HEART RATE: 62 BPM | RESPIRATION RATE: 20 BRPM

## 2018-04-25 PROCEDURE — 74011250637 HC RX REV CODE- 250/637: Performed by: SURGERY

## 2018-04-25 PROCEDURE — 74011250636 HC RX REV CODE- 250/636: Performed by: SURGERY

## 2018-04-25 RX ADMIN — SODIUM CHLORIDE, SODIUM LACTATE, POTASSIUM CHLORIDE, AND CALCIUM CHLORIDE 125 ML/HR: 600; 310; 30; 20 INJECTION, SOLUTION INTRAVENOUS at 10:15

## 2018-04-25 RX ADMIN — ASPIRIN 81 MG: 81 TABLET, COATED ORAL at 08:07

## 2018-04-25 RX ADMIN — SODIUM CHLORIDE, SODIUM LACTATE, POTASSIUM CHLORIDE, AND CALCIUM CHLORIDE 125 ML/HR: 600; 310; 30; 20 INJECTION, SOLUTION INTRAVENOUS at 00:27

## 2018-04-25 RX ADMIN — LEVOTHYROXINE SODIUM 125 MCG: 125 TABLET ORAL at 06:53

## 2018-04-25 RX ADMIN — ENOXAPARIN SODIUM 40 MG: 100 INJECTION SUBCUTANEOUS at 09:00

## 2018-04-25 RX ADMIN — Medication 10 ML: at 06:53

## 2018-04-25 NOTE — DISCHARGE INSTRUCTIONS
Future Appointments  Date Time Provider Charu Thorpe   6/8/2018 10:20 AM Nicolle Bermeo NP Alfa Mahajan      Post-op appointment scheduled for Tuesday, May 8, 2018 at 10 am with Annie Rao NP. Call office at [phone] if you need to make changes to this appointment. Laparoscopic Bowel Resection: What to Expect at [address] have had part of your small or large intestine taken out. You are likely to have pain that comes and goes for the next few days. You may feel like you have the flu. You also may have a low fever and feel tired and nauseated. This is common. You should feel better after 1 to 2 weeks and will probably be back to normal in 2 to 4 weeks. Your bowel movements may not be regular for several weeks. Also, you may have some blood in your stool. This care sheet gives you a general idea about how long it will take for you to recover. But each person recovers at a different pace. Follow the steps below to get better as quickly as possible. How can you care for yourself at home? Activity  ? · Rest when you feel tired. Getting enough sleep will help you recover. ? · Try to walk each day. Start by walking a little more than you did the day before. Bit by bit, increase the amount you walk. Walking boosts blood flow and helps prevent pneumonia and constipation. ? · Avoid strenuous activities, such as biking, jogging, weight lifting, or aerobic exercise, until your doctor says it is okay. ? · Avoid lifting anything that would make you strain. This may include heavy grocery bags and milk containers, a heavy briefcase or backpack, cat litter or dog food bags, a vacuum , or a child. ? · Ask your doctor when you can drive again. ? · You will probably need to take 2 to 4 weeks off from work. It depends on the type of work you do and how you feel. ? · You may shower 48 hours after surgery. Pat the cuts (incisions) dry.  Do not take a bath for the first 2 weeks, or until your doctor tells you it is okay. ? · Ask your doctor when it is okay for you to have sex. Diet  ? · You may not have much appetite after the surgery. But try to eat a healthy diet. Your doctor will tell you about any foods you should not eat. ? · Eat a low-fiber diet for several weeks after surgery. Eat many small meals throughout the day. Add high-fiber foods a little at a time. ? · Eat yogurt. It puts good bacteria into your colon and helps prevent diarrhea. ? · Try to avoid nuts, seeds, and corn for a while. They may be hard to digest.   ? · You may need to take vitamins that contain sodium and potassium. Your doctor will tell you whether you should take any vitamins or supplements. ? · Drink plenty of fluids (enough so that your urine is light yellow or clear like water) to prevent dehydration. Choose water and other caffeine-free clear liquids until you feel better. If you have kidney, heart, or liver disease and have to limit fluids, talk with your doctor before you increase the amount of fluids you drink. Medicines  ? · Your doctor will tell you if and when you can restart your medicines. He or she will also give you instructions about taking any new medicines. ? · If you take blood thinners, such as warfarin (Coumadin), clopidogrel (Plavix), or aspirin, be sure to talk to your doctor. He or she will tell you if and when to start taking those medicines again. Make sure that you understand exactly what your doctor wants you to do. ? · Take pain medicines exactly as directed. ¨ If the doctor gave you a prescription medicine for pain, take it as prescribed. ¨ If you are not taking a prescription pain medicine, ask your doctor if you can take an over-the-counter medicine. ? · If your doctor prescribed antibiotics, take them as directed. Do not stop taking them just because you feel better. You need to take the full course of antibiotics.    ? · You may need to take some medicines in a different form. You will be told whether to crush pills or take a liquid form of the medicine. ? · If your doctor gives you a stool softener, take it as directed. Incision care  ? · If you have strips of tape on the incisions, leave the tape on for a week or until it falls off.   ? · Wash the area daily with warm, soapy water and pat it dry. Don't use hydrogen peroxide or alcohol, which can slow healing. You may cover the area with a gauze bandage if it weeps or rubs against clothing. Change the bandage every day. Follow-up care is a key part of your treatment and safety. Be sure to make and go to all appointments, and call your doctor if you are having problems. It's also a good idea to know your test results and keep a list of the medicines you take. When should you call for help? Call 911 anytime you think you may need emergency care. For example, call if:  ? · You passed out (lost consciousness). ? · You are short of breath. ?Call your doctor now or seek immediate medical care if:  ? · You have pain that does not get better after you take pain medicine. ? · You cannot pass stool or gas. ? · You are sick to your stomach and cannot keep fluids down. ? · Bright red blood has soaked through your bandage. ? · You have loose stitches, or your incision comes open. ? · You have signs of a blood clot in your leg (called a deep vein thrombosis), such as:  ¨ Pain in your calf, back of the knee, thigh, or groin. ¨ Redness and swelling in your leg or groin. ? · You have signs of infection, such as:  ¨ Increased pain, swelling, warmth, or redness. ¨ Red streaks leading from the incision. ¨ Pus draining from the incision. ¨ A fever. ? Watch closely for any changes in your health, and be sure to contact your doctor if you have any problems. Where can you learn more? Go to http://theresa-sandra.info/.   Enter H191 in the search box to learn more about \"Laparoscopic Bowel Resection: What to Expect at Home. \"  Current as of: May 12, 2017  Content Version: 11.4  © 6713-6853 [employer]wise, Incorporated. Care instructions adapted under license by [employer] (which disclaims liability or warranty for this information). If you have questions about a medical condition or this instruction, always ask your healthcare professional. [place] [address] any warranty or liability for your use of this information.

## 2018-04-25 NOTE — PROGRESS NOTES
Daily Progress Note  [facility] at [address] Date: 2018  Post-Operative Day: 2 from Procedure(s):  LAPAROSCOPIC SMALL BOWEL RESECTION     Subjective:     Last 24 hrs: Denies pain, ambulating frequently. Tolerating PO, + flatus and + BM. Requesting discharge. Objective:     Blood pressure 134/73, pulse 62, temperature 98.7 °F (37.1 °C), resp. rate 20, height 5' 10\" (1.778 m), weight 129.4 kg (285 lb 6 oz), SpO2 93 %. Temp (24hrs), Av.2 °F (36.8 °C), Min:97.5 °F (36.4 °C), Max:98.7 °F (37.1 °C)      _____________________  Physical Exam:     Alert and Oriented, sitting up in chair, no acute distress. Cardiovascular: RRR, no peripheral edema  Lungs:CTAB   Abdomen: + BS, soft, NT. Incisions healing well. Assessment:   Principal Problem:    Intussusception of jejunum ([address] (2018)    Active Problems:    Periumbilical abdominal pain (4/10/2018)            Plan:     Stable for discharge  F/U in office with Vito Harvey NP on Tuesday May 8, at 10 am.   Instructions reviewed - she does not want narcotic pain medication. Deng Lenz [facility] at [address], [address] [place] [address]  [phone]    Data Review:    Recent Labs      18   0311   WBC  7.3   HGB  10.4*   HCT  32.8*   PLT  203     Recent Labs      18   0311   NA  141   K  4.2   CL  107   CO2  27   GLU  104*   BUN  12   CREA  0.84   CA  7.9*     No results for input(s): AML, LPSE in the last 72 hours.         ______________________  Medications:    Current Facility-Administered Medications   Medication Dose Route Frequency    traMADol (ULTRAM) tablet 50 mg  50 mg Oral Q6H PRN    levothyroxine (SYNTHROID) tablet 125 mcg  125 mcg Oral ACB    aspirin delayed-release tablet 81 mg  81 mg Oral DAILY    acetaminophen (TYLENOL) tablet 650 mg  650 mg Oral Q4H PRN    fexofenadine (ALLEGRA) tablet 180 mg (Patient Supplied)  180 mg Oral DAILY  sodium chloride (NS) flush 5-10 mL  5-10 mL IntraVENous Q8H    sodium chloride (NS) flush 5-10 mL  5-10 mL IntraVENous PRN    lactated Ringers infusion  125 mL/hr IntraVENous CONTINUOUS    HYDROmorphone (DILAUDID) injection 1 mg  1 mg IntraVENous Q3H PRN    ondansetron (ZOFRAN) injection 4 mg  4 mg IntraVENous Q4H PRN    enoxaparin (LOVENOX) injection 40 mg  40 mg SubCUTAneous Q24H    LORazepam (ATIVAN) injection 1 mg  1 mg IntraVENous Q8H PRN    albuterol (PROVENTIL VENTOLIN) nebulizer solution 2.5 mg  2.5 mg Nebulization Q6H PRN    escitalopram oxalate (LEXAPRO) tablet 20 mg  20 mg Oral QHS

## 2018-04-25 NOTE — PROGRESS NOTES
Bedside and Verbal shift change report given to Jose Saldana RN (oncoming nurse) by Junior Willis RN (offgoing nurse). Report included the following information SBAR, Kardex, ED Summary, Intake/Output, MAR and Recent Results.

## 2018-04-25 NOTE — PROGRESS NOTES
Pt's home allegra given to pharmacy tech for verification around 1800. Bedside shift change report given to Hong Perez (oncoming nurse) by Gretchen Good (offgoing nurse). Report included the following information SBAR, Kardex, Intake/Output, MAR and Recent Results.

## 2018-04-25 NOTE — DISCHARGE SUMMARY
Discharge Summary  [facility] at [facility]     Patient ID:  Polo Spindle  [id number]  61 y.o.  1955    Admit Date: 4/23/2018    Discharge Date: 4/25/2018    Admission Diagnoses: SMALL BOWEL INTUSSUSCEPTION  Intussusception of jejunum ([address]  Intussusception of jejunum [facility])    Discharge Diagnoses:  Principal Problem:    Intussusception of jejunum ([address] (4/23/2018)    Active Problems:    Periumbilical abdominal pain (4/10/2018)         Admission Condition: Poor    Discharge Condition: Stable    Last Procedure: Procedure(s):  [facility] Course:   She presented with 3 week history of periumbilical abdominal pain with bloating and nausea. She was found to have intussusception causing partial blockage at the jejunojejunostomy. She underwent small bowel resection with laparoscopic reconstruction. She recovered well with return of bowel function, tolerating PO intake, and ambulating frequently. Denies pain and nausea. She will f/u in the office. Consults: None    Disposition: home    Patient Instructions:   Current Discharge Medication List      CONTINUE these medications which have NOT CHANGED    Details   acetaminophen (TYLENOL EXTRA STRENGTH) 500 mg tablet Take 1,000 mg by mouth every six (6) hours as needed for Pain. Indications: Pain      escitalopram oxalate (LEXAPRO) 20 mg tablet Take 1 Tab by mouth daily. Indications: ANXIETY WITH DEPRESSION  Qty: 90 Tab, Refills: 1      albuterol (PROAIR HFA) 90 mcg/actuation inhaler Take 2 Puffs by inhalation every six (6) hours as needed for Wheezing. Indications: Acute Asthma Attack  Qty: 1 Inhaler, Refills: 11      ALPRAZolam (XANAX) 0.5 mg tablet Take 1 Tab by mouth three (3) times daily as needed for Sleep or Anxiety. Indications: ANXIETY  Qty: 30 Tab, Refills: 1      traMADol (ULTRAM) 50 mg tablet 50 mg nightly as needed. fexofenadine (ALLEGRA) 180 mg tablet Take 180 mg by mouth nightly. infliximab (REMICADE) 100 mg injection 5 mg/kg by IntraVENous route once. [id number]      VIT C/E/ZN/COPPR/LUTEIN/ZEAXAN (PRESERVISION AREDS 2 PO) Take  by mouth. furosemide (LASIX) 40 mg tablet Take 1 Tab by mouth daily. Qty: 90 Tab, Refills: 1      levothyroxine (SYNTHROID) 125 mcg tablet TAKE ONE TABLET BY MOUTH EVERY DAY BEFORE BREAKFAST - LAB WORK &FOLLOW UP APPOINTMENT  Qty: 90 Tab, Refills: 1      aspirin delayed-release 81 mg tablet Take  by mouth daily. leucovorin calcium (WELLCOVORIN) 5 mg tablet Take  by mouth every seven (7) days. methotrexate (RHEUMATREX) 2.5 mg tablet Take 8 Tabs by mouth every Sunday. folic acid (FOLVITE) 1 mg tablet Take 4 mg by mouth daily. Activity: No lifting, pushing or pulling anything heavier than 20 lbs x 1 week. Walking as tolerated. No driving while you are taking narcotics. Diet: Regular Diet. If you have cramps or nausea, try eating smaller light meals more frequently. You may find it helpful to take an over-the-counter stool softener such as colace if you feel constipated. Wound Care: Keep clean and dry. You may shower, but no immersion in standing water (bath tubs, swimming pools) x 1 week. Pat area with soft towel to dry. Follow-up with Jonny Campbell NP on Tuesday, May 8, 2018 at 10 am. Call office at [phone] if you need to make changes to this appointment. We are located at [facility] in the [facility]. Call office and notify provider [phone] if you develop fever >101, persistent vomiting, signs of infection at your surgical site including redness, swelling, or drainage that looks like pus or green/brown fluid, or increasing pain that does not improve with pain medication.       Signed:  Jeanenne Severin, NP  4/25/2018  10:50 AM

## 2018-05-08 ENCOUNTER — OFFICE VISIT (OUTPATIENT)
Dept: SURGERY | Age: 63
End: 2018-05-08

## 2018-05-08 VITALS
HEIGHT: 70 IN | DIASTOLIC BLOOD PRESSURE: 68 MMHG | BODY MASS INDEX: 40.09 KG/M2 | HEART RATE: 78 BPM | RESPIRATION RATE: 18 BRPM | TEMPERATURE: 98.6 F | OXYGEN SATURATION: 98 % | WEIGHT: 280 LBS | SYSTOLIC BLOOD PRESSURE: 110 MMHG

## 2018-05-08 DIAGNOSIS — E53.8 B12 DEFICIENCY: ICD-10-CM

## 2018-05-08 DIAGNOSIS — E55.9 VITAMIN D DEFICIENCY: ICD-10-CM

## 2018-05-08 DIAGNOSIS — D50.9 IRON DEFICIENCY ANEMIA, UNSPECIFIED IRON DEFICIENCY ANEMIA TYPE: ICD-10-CM

## 2018-05-08 DIAGNOSIS — K91.2 POSTOPERATIVE INTESTINAL MALABSORPTION: ICD-10-CM

## 2018-05-08 DIAGNOSIS — Z09 FOLLOW-UP EXAMINATION FOLLOWING SURGERY: Primary | ICD-10-CM

## 2018-05-08 NOTE — MR AVS SNAPSHOT
[address] [facility] 63 [facility] [address] [id number] [phone] Patient: Kathi Fabian MRN:  [id number] Visit Information Date & Time Provider Department Dept. Phone Encounter #  
 5/8/2018 10:00 AM Mena Luciano NP [address] [phone] Your Appointments 6/8/2018 10:20 AM  
POST OP with Mena Luciano NP  
[facility] 22 335 ([address]) Appt Note: 5-25-18 BC: Excisional biopsy, left flank mass, p.o  
 [address] 63 [facility] [address]  
[address] [place] [address] Upcoming Health Maintenance Date Due Pneumococcal 19-64 Medium Risk (1 of 1 - PPSV23) 2/11/1974 ZOSTER VACCINE AGE 60> 12/11/2014 BREAST CANCER SCRN MAMMOGRAM 4/7/2018 Influenza Age 5 to Adult 8/1/2018 PAP AKA CERVICAL CYTOLOGY 4/7/2019 COLONOSCOPY 5/6/2026 DTaP/Tdap/Td series (2 - Td) 2/22/2027 Allergies as of 5/8/2018  Review Complete On: 5/8/2018 By: Yadira Verdugo LPN No Known Allergies Current Immunizations  Reviewed on 2/22/2017 Name Date Influenza Vaccine 9/30/2017, 10/30/2016 Tdap 2/22/2017 Not reviewed this visit You Were Diagnosed With   
  
 Codes Comments Iron deficiency anemia, unspecified iron deficiency anemia type    -  Primary ICD-10-CM: D50.9 ICD-9-CM: 280.9 Postoperative intestinal malabsorption     ICD-10-CM: K91.2 ICD-9-CM: 579.3 B12 deficiency     ICD-10-CM: E53.8 ICD-9-CM: 266.2 Vitamin D deficiency     ICD-10-CM: E55.9 ICD-9-CM: 268.9 Vitals BP Pulse Temp Resp Height(growth percentile) Weight(growth percentile) 110/68 78 98.6 °F (37 °C) 18 5' 10\" (1.778 m) 280 lb (127 kg) SpO2 BMI OB Status Smoking Status 98% 40.18 kg/m2 Postmenopausal Never Smoker Vitals History BMI and BSA Data Body Mass Index Body Surface Area  
 40.18 kg/m 2 2.5 m 2 Preferred Pharmacy Pharmacy Name Phone [id number] [address] [facility] [phone] Your Updated Medication List  
  
   
This list is accurate as of 5/8/18 10:36 AM.  Always use your most recent med list.  
  
  
  
  
 albuterol 90 mcg/actuation inhaler Commonly known as:  PROAIR HFA Take 2 Puffs by inhalation every six (6) hours as needed for Wheezing. Indications: Acute Asthma Attack ALPRAZolam 0.5 mg tablet Commonly known as:  Peola Haydee Take 1 Tab by mouth three (3) times daily as needed for Sleep or Anxiety. Indications: ANXIETY  
  
 aspirin delayed-release 81 mg tablet Take  by mouth daily. escitalopram oxalate 20 mg tablet Commonly known as:  Cy Null Take 1 Tab by mouth daily. Indications: ANXIETY WITH DEPRESSION  
  
 fexofenadine 180 mg tablet Commonly known as:  Ca Mike Take 180 mg by mouth nightly. folic acid 1 mg tablet Commonly known as:  Google Take 4 mg by mouth daily. furosemide 40 mg tablet Commonly known as:  LASIX Take 1 Tab by mouth daily. leucovorin calcium 5 mg tablet Commonly known as:  Srinivas Bent Take  by mouth every seven (7) days. levothyroxine 125 mcg tablet Commonly known as:  SYNTHROID  
TAKE ONE TABLET BY MOUTH EVERY DAY BEFORE BREAKFAST - LAB WORK &FOLLOW UP APPOINTMENT  
  
 methotrexate 2.5 mg tablet Commonly known as:  Mahamed Elaine Take 8 Tabs by mouth every Sunday. PRESERVISION AREDS 2 PO Take  by mouth. REMICADE 100 mg injection Generic drug:  inFLIXimab  
5 mg/kg by IntraVENous route once. [id number]  
  
 traMADol 50 mg tablet Commonly known as:  ULTRAM  
50 mg nightly as needed. TYLENOL EXTRA STRENGTH 500 mg tablet Generic drug:  acetaminophen Take 1,000 mg by mouth every six (6) hours as needed for Pain. Indications: Pain We Performed the Following IRON PROFILE [id number] CPT(R)] VITAMIN B12 & FOLATE [03254 CPT(R)] VITAMIN D, 25 HYDROXY [id number] CPT(R)] Patient Instructions Activity:  You may swim in a pool. Continue to avoid heavy lifting, pushing or pulling > 15lbs. No abdominal exercises. You may drive. Vitamins after gastric bypass: 
 
Multi with 18 mg iron twice per day (Women's One a Day, [place] Complete) B12 500 mcg per day Vitamin D 5000 iu per day Calcium citrate 500 mg 2 tabs daily (separate by 2 hours from MVI) Dietician:  You can call or email for an appointment Belkis Ferris RD at: 
 
[phone] [email or web address] [facility]cing [facility] [place] [facility]! Dear Maribell Gomez: Thank you for requesting a [employer] account. Our records indicate that you already have an active [employer] account. You can access your account anytime at [email or web address] Did you know that you can access your hospital and ER discharge instructions at any time in [employer]? You can also review all of your test results from your hospital stay or ER visit. Additional Information If you have questions, please visit the Frequently Asked Questions section of the [employer] website at [email or web address] Remember, [employer] is NOT to be used for urgent needs. For medical emergencies, dial 911. Now available from your iPhone and Android! Please provide this summary of care documentation to your next provider. Your primary care clinician is listed as [phone] If you have any questions after today's visit, please call [phone].

## 2018-05-08 NOTE — PATIENT INSTRUCTIONS
Activity:  You may swim in a pool. Continue to avoid heavy lifting, pushing or pulling > 15lbs. No abdominal exercises. You may drive.       Vitamins after gastric bypass:    Multi with 18 mg iron twice per day (Women's One a Day, [place] Complete)    B12 500 mcg per day     Vitamin D 5000 iu per day     Calcium citrate 500 mg 2 tabs daily (separate by 2 hours from MVI)       Dietician:  You can call or email for an appointment    Jean Aguirre RD at:    [phone]  [email or web address]

## 2018-05-08 NOTE — PROGRESS NOTES
1. Have you been to the ER, urgent care clinic since your last visit? Hospitalized since your last visit?  no    2. Have you seen or consulted any other health care providers outside of the [address] [place] [address] since your last visit? Include any pap smears or colon screening.    no

## 2018-05-17 NOTE — PROGRESS NOTES
HISTORY OF PRESENT ILLNESS  Robert Ojeda is a 61 y.o. female. HPI   Chief Complaint   Patient presents with    Surgical Follow-up     2 weeks s/p small bowel resection by Dr Carlin Carlton. 16 years s/p gastric bypass by Dr Christobal Apley. Robert Ojeda is a 61 y.o. female presents 2 weeks s/p PROCEDURE PERFORMED:  Laparoscopic small bowel resection with jejunojejunostomy reconstruction (CPT [id number], CPT 05385  FINDINGS:  Nonobstructing  jejunojejunostomy intussusception, resected with re-creation of the jejunojejunostomy through 2 separate anastomoses, providing a 150 cm Venceia limb. She is feeling better and pain she was having has resolved. She is still sore, but \"not like before\". Review of Systems   Constitutional: Positive for malaise/fatigue (getting better ). Negative for chills and fever. Respiratory: Negative for cough and shortness of breath. Cardiovascular: Negative for chest pain and palpitations. Gastrointestinal: Positive for abdominal pain (sore from surgery , some pain with activity ) and constipation. Negative for blood in stool, diarrhea, heartburn, melena, nausea and vomiting. Genitourinary: Negative for dysuria. Musculoskeletal: Positive for myalgias. Endo/Heme/Allergies:        Not great about taking bariatric vitamins        Physical Exam   Constitutional: She is oriented to person, place, and time. No distress. /68  Pulse 78  Temp 98.6 °F (37 °C)  Resp 18  Ht 5' 10\" (1.778 m)  Wt 280 lb (127 kg)  SpO2 98%  BMI 40.18 kg/m2  White female here with spouse    Cardiovascular: Normal rate and regular rhythm. Pulmonary/Chest: Effort normal and breath sounds normal. No respiratory distress. Abdominal: Soft. Bowel sounds are normal. She exhibits no distension and no mass. There is tenderness. There is no rebound and no guarding.    Lap sites are C/D/I and no erythema or induration   Tender as appropriate    Musculoskeletal:   Ambulating independently Neurological: She is alert and oriented to person, place, and time. Skin: Skin is warm. She is not diaphoretic. ASSESSMENT and PLAN    ICD-10-CM ICD-9-CM    1. Follow-up examination following surgery Z09 V67.00    2. BMI 40.0-44.9, adult ([facility] [address] Z68.41 V85.41    3. Postoperative intestinal malabsorption K91.2 579.3 IRON PROFILE      VITAMIN B12 & FOLATE      VITAMIN D, 25 HYDROXY   4. Iron deficiency anemia, unspecified iron deficiency anemia type D50.9 280.9 IRON PROFILE      VITAMIN B12 & FOLATE      VITAMIN D, 25 HYDROXY   5. B12 deficiency E53.8 266.2 IRON PROFILE      VITAMIN B12 & FOLATE      VITAMIN D, 25 HYDROXY   6. Vitamin D deficiency E55.9 268.9 IRON PROFILE      VITAMIN B12 & FOLATE      VITAMIN D, 25 HYDROXY      2 weeks s/p Laparoscopic small bowel resection with jejunojejunostomy reconstruction  Diet Bariatric and referred to RD   Vitamin labs today   Activity may swim in a pool, may drive   Avoid lifting, straining, pushing or pulling. No abdominal exercises x 6 weeks  Return to work 5/17/18 regular duties   Encouraged bariatric follow up   Lucila Maharaj verbalized understanding and questions were answered to the best of my knowledge and ability. Activity  educational materials were provided. Lucila Maharaj verbalized understanding and questions were answered to the best of my knowledge and ability. Activity  educational materials were provided.

## 2018-05-21 RX ORDER — LEVOTHYROXINE SODIUM 125 UG/1
125 TABLET ORAL
Qty: 90 TAB | Refills: 0 | Status: SHIPPED | OUTPATIENT
Start: 2018-05-21 | End: 2019-01-06 | Stop reason: SDUPTHER

## 2018-05-21 NOTE — TELEPHONE ENCOUNTER
Orders Placed This Encounter    levothyroxine (SYNTHROID) 125 mcg tablet     Sig: Take 1 Tab by mouth Daily (before breakfast). Dispense:  90 Tab     Refill:  0     The above medication refills were approved via verbal order by Dr. Gisselle Morales III.

## 2018-08-06 RX ORDER — MECLIZINE HYDROCHLORIDE 25 MG/1
25 TABLET ORAL
Qty: 20 TAB | Refills: 0 | Status: SHIPPED | OUTPATIENT
Start: 2018-08-06 | End: 2018-08-16

## 2018-08-06 NOTE — TELEPHONE ENCOUNTER
Orders Placed This Encounter    meclizine (ANTIVERT) 25 mg tablet     Sig: Take 1 Tab by mouth three (3) times daily as needed for up to 10 days. Dispense:  20 Tab     Refill:  0     The above medication refills were approved via verbal order by Dr. Slime Lopez III.

## 2018-08-08 ENCOUNTER — OFFICE VISIT (OUTPATIENT)
Dept: INTERNAL MEDICINE CLINIC | Age: 63
End: 2018-08-08

## 2018-08-08 VITALS
SYSTOLIC BLOOD PRESSURE: 104 MMHG | DIASTOLIC BLOOD PRESSURE: 62 MMHG | TEMPERATURE: 98 F | HEART RATE: 70 BPM | RESPIRATION RATE: 18 BRPM | OXYGEN SATURATION: 96 % | HEIGHT: 70 IN

## 2018-08-08 DIAGNOSIS — R42 VERTIGO: Primary | ICD-10-CM

## 2018-08-08 RX ORDER — FLUTICASONE PROPIONATE 50 MCG
2 SPRAY, SUSPENSION (ML) NASAL DAILY
Qty: 1 BOTTLE | Refills: 1 | Status: SHIPPED | OUTPATIENT
Start: 2018-08-08 | End: 2019-07-18 | Stop reason: ALTCHOICE

## 2018-08-08 NOTE — PROGRESS NOTES
Severo Fujita is a 61 y.o. female who was seen in clinic today (8/8/2018). Assessment & Plan:   Diagnoses and all orders for this visit:    1. Vertigo- this is a recurrent problem that is new to me, symptoms are unchanged, differential dx reviewed with the patient, exact etiology is unclear at this time. Does not really fit for BPPV or Meniere's. Doubt shingles or trigeminal neuralgia. It sounds like it could be sinus related, but does not look like active infection. Reviewed mucinex/robitussin, Flonase, and neti pot. Reviewed when to consider antibiotics. Red flags were reviewed with the patient to RTC or notify me, expected time course for resolution reviewed. -     fluticasone (FLONASE) 50 mcg/actuation nasal spray; 2 Sprays by Both Nostrils route daily. Follow-up Disposition:  Return if symptoms worsen or fail to improve. Subjective:   Ben Roca was seen today for Dizziness    Vertigo  Patient complains of unsteadiness. The symptoms started 4 days ago and are unchanged. The attacks occur constantly but at times does get worse. She also reports left sided facial and left sided ear fullness. There is some post nasal drip. No fevers, chills, sinus pain, sore throat, or rhinorrhea. Positions that make her symptoms worse: none. Head trauma: denied. Previous workup/treatments: she reports h/o Meniere's and BPPV. She has tried Advil cold/sinus w/ some relief. Prior to Admission medications    Medication Sig Start Date End Date Taking? Authorizing Provider   meclizine (ANTIVERT) 25 mg tablet Take 1 Tab by mouth three (3) times daily as needed for up to 10 days. 8/6/18 8/16/18 Yes Janel Gutiérrez MD   levothyroxine (SYNTHROID) 125 mcg tablet Take 1 Tab by mouth Daily (before breakfast). 5/21/18  Yes Janel Gutiérrez MD   acetaminophen (TYLENOL EXTRA STRENGTH) 500 mg tablet Take 1,000 mg by mouth every six (6) hours as needed for Pain.  Indications: Pain   Yes Historical Provider   escitalopram oxalate (LEXAPRO) 20 mg tablet Take 1 Tab by mouth daily. Indications: ANXIETY WITH DEPRESSION  Patient taking differently: Take 20 mg by mouth nightly. Indications: ANXIETY WITH DEPRESSION 3/29/18  Yes Sera Leonardo MD   VIT C/E/ZN/COPPR/LUTEIN/ZEAXAN (PRESERVISION AREDS 2 PO) Take  by mouth. Yes Historical Provider   albuterol (PROAIR HFA) 90 mcg/actuation inhaler Take 2 Puffs by inhalation every six (6) hours as needed for Wheezing. Indications: Acute Asthma Attack 11/6/17  Yes Meng Rivera III, MD   furosemide (LASIX) 40 mg tablet Take 1 Tab by mouth daily. 9/20/17  Yes Sera Leonardo MD   ALPRAZolam [place]) 0.5 mg tablet Take 1 Tab by mouth three (3) times daily as needed for Sleep or Anxiety. Indications: ANXIETY 2/22/17  Yes Sera Leonardo MD   traMADol [facility]) 50 mg tablet 50 mg nightly as needed. 11/26/14  Yes Historical Provider   aspirin delayed-release 81 mg tablet Take  by mouth daily. Yes Historical Provider   leucovorin calcium (WELLCOVORIN) 5 mg tablet Take  by mouth every seven (7) days. Yes Historical Provider   fexofenadine (ALLEGRA) 180 mg tablet Take 180 mg by mouth nightly. 8/6/12  Yes Meng Rivera III, MD   methotrexate (RHEUMATREX) 2.5 mg tablet Take 8 Tabs by mouth every Sunday. 12/19/11  Yes Historical Provider   infliximab (REMICADE) 100 mg injection 5 mg/kg by IntraVENous route once. [id number]   Yes Historical Provider   folic acid (FOLVITE) 1 mg tablet Take 4 mg by mouth daily. Yes Historical Provider          No Known Allergies        ROS - per HPI        Objective:   Physical Exam   Constitutional: No distress. obese   HENT:   Right Ear: Tympanic membrane is not erythematous and not bulging. No middle ear effusion. Left Ear: Tympanic membrane is not erythematous and not bulging. No middle ear effusion. Nose: No mucosal edema or rhinorrhea.  Right sinus exhibits no maxillary sinus tenderness and no frontal sinus tenderness. Left sinus exhibits no maxillary sinus tenderness and no frontal sinus tenderness. Mouth/Throat: Uvula is midline and mucous membranes are normal. No oropharyngeal exudate or posterior oropharyngeal erythema. Eyes: Conjunctivae are normal. No scleral icterus. Neck: Neck supple. Cardiovascular: Regular rhythm and normal heart sounds. No murmur heard. Pulmonary/Chest: Effort normal and breath sounds normal. She has no wheezes. She has no rales. Lymphadenopathy:     She has no cervical adenopathy. Visit Vitals    /62    Pulse 70    Temp 98 °F (36.7 °C) (Oral)    Resp 18    Ht 5' 10\" (1.778 m)    SpO2 96%         Disclaimer:  Advised her to call back or return to office if symptoms worsen/change/persist.  Discussed expected course/resolution/complications of diagnosis in detail with patient. Medication risks/benefits/costs/interactions/alternatives discussed with patient. She was given an after visit summary which includes diagnoses, current medications, & vitals. She expressed understanding with the diagnosis and plan. Aspects of this note may have been generated using voice recognition software. Despite editing, there may be some syntax errors.        Grace Houser MD

## 2018-09-26 RX ORDER — FUROSEMIDE 40 MG/1
40 TABLET ORAL DAILY
Qty: 90 TAB | Refills: 1 | Status: SHIPPED | OUTPATIENT
Start: 2018-09-26 | End: 2019-03-28 | Stop reason: SDUPTHER

## 2018-09-26 NOTE — TELEPHONE ENCOUNTER
Orders Placed This Encounter  furosemide (LASIX) 40 mg tablet Sig: Take 1 Tab by mouth daily. Dispense:  90 Tab Refill:  1 The above medication refills were approved via verbal order by Dr. Oren Bettencourt.

## 2018-09-27 RX ORDER — ESCITALOPRAM OXALATE 20 MG/1
20 TABLET ORAL DAILY
Qty: 90 TAB | Refills: 1 | Status: SHIPPED | OUTPATIENT
Start: 2018-09-27 | End: 2019-03-28 | Stop reason: SDUPTHER

## 2018-11-06 RX ORDER — AZITHROMYCIN 250 MG/1
250 TABLET, FILM COATED ORAL SEE ADMIN INSTRUCTIONS
Qty: 6 TAB | Refills: 0 | Status: SHIPPED | OUTPATIENT
Start: 2018-11-06 | End: 2018-11-11

## 2019-01-06 RX ORDER — LEVOTHYROXINE SODIUM 125 UG/1
TABLET ORAL
Qty: 90 TAB | Refills: 0 | Status: SHIPPED | OUTPATIENT
Start: 2019-01-06 | End: 2019-03-28 | Stop reason: SDUPTHER

## 2019-03-26 LAB — CREATININE, EXTERNAL: 0.6

## 2019-03-28 ENCOUNTER — PATIENT MESSAGE (OUTPATIENT)
Dept: INTERNAL MEDICINE CLINIC | Age: 64
End: 2019-03-28

## 2019-03-28 DIAGNOSIS — M06.9 RHEUMATOID ARTHRITIS, INVOLVING UNSPECIFIED SITE, UNSPECIFIED RHEUMATOID FACTOR PRESENCE: Primary | ICD-10-CM

## 2019-03-28 RX ORDER — MECLIZINE HYDROCHLORIDE 25 MG/1
25 TABLET ORAL
Qty: 60 TAB | Refills: 1 | Status: SHIPPED | OUTPATIENT
Start: 2019-03-28 | End: 2019-04-07

## 2019-03-28 RX ORDER — ALBUTEROL SULFATE 90 UG/1
2 AEROSOL, METERED RESPIRATORY (INHALATION)
Qty: 1 INHALER | Refills: 11 | Status: SHIPPED | OUTPATIENT
Start: 2019-03-28

## 2019-03-28 RX ORDER — TRAMADOL HYDROCHLORIDE 50 MG/1
50 TABLET ORAL
Qty: 30 TAB | Refills: 0 | OUTPATIENT
Start: 2019-03-28 | End: 2019-04-27

## 2019-03-28 RX ORDER — FUROSEMIDE 40 MG/1
40 TABLET ORAL DAILY
Qty: 90 TAB | Refills: 1 | Status: SHIPPED | OUTPATIENT
Start: 2019-03-28 | End: 2019-09-28 | Stop reason: SDUPTHER

## 2019-03-28 RX ORDER — LEVOTHYROXINE SODIUM 125 UG/1
TABLET ORAL
Qty: 90 TAB | Refills: 0 | Status: SHIPPED | OUTPATIENT
Start: 2019-03-28 | End: 2019-06-19 | Stop reason: SDUPTHER

## 2019-03-28 RX ORDER — ESCITALOPRAM OXALATE 20 MG/1
20 TABLET ORAL DAILY
Qty: 90 TAB | Refills: 1 | Status: SHIPPED | OUTPATIENT
Start: 2019-03-28 | End: 2019-06-19 | Stop reason: SDUPTHER

## 2019-03-28 NOTE — TELEPHONE ENCOUNTER
Orders Placed This Encounter  traMADol (ULTRAM) 50 mg tablet Sig: Take 1 Tab by mouth nightly as needed for Pain for up to 30 days. Max Daily Amount: 50 mg. Dispense:  30 Tab Refill:  0  
 escitalopram oxalate (LEXAPRO) 20 mg tablet Sig: Take 1 Tab by mouth daily. Indications: Anxiousness associated with Depression Dispense:  90 Tab Refill:  1  
 furosemide (LASIX) 40 mg tablet Sig: Take 1 Tab by mouth daily. Dispense:  90 Tab Refill:  1  
 albuterol (PROAIR HFA) 90 mcg/actuation inhaler Sig: Take 2 Puffs by inhalation every six (6) hours as needed for Wheezing. Indications: Asthma Attack Dispense:  1 Inhaler Refill:  11  
 levothyroxine (SYNTHROID) 125 mcg tablet Sig: TAKE ONE TABLET BY MOUTH ONE TIME DAILY BEFORE BREAKFAST Dispense:  90 Tab Refill:  0  
 meclizine (ANTIVERT) 25 mg tablet Sig: Take 1 Tab by mouth three (3) times daily as needed for Dizziness for up to 10 days. Dispense:  60 Tab Refill:  1 The above controlled substance refill was called into patients pharmacy - [facility] - authorized by Dr. Scar Her.

## 2019-03-28 NOTE — TELEPHONE ENCOUNTER
From: Melina Schultz To: Armando Child MD 
Sent: 3/28/2019 11:50 AM EDT Subject: Prescription Question We have to change pharmacy again due to insurance: 
Could you please order 90 day supply of: 
Lexapro Tramadol Furosemide Meclizine ProAir inhaler Levothyroxine [facility]-[phone]- [facility]. Thank you!

## 2019-05-10 DIAGNOSIS — M06.9 RHEUMATOID ARTHRITIS, INVOLVING UNSPECIFIED SITE, UNSPECIFIED RHEUMATOID FACTOR PRESENCE: Primary | ICD-10-CM

## 2019-05-10 RX ORDER — TRAMADOL HYDROCHLORIDE 50 MG/1
TABLET ORAL
Qty: 30 TAB | Refills: 0 | OUTPATIENT
Start: 2019-05-10 | End: 2019-05-12 | Stop reason: SDUPTHER

## 2019-05-12 DIAGNOSIS — M06.9 RHEUMATOID ARTHRITIS, INVOLVING UNSPECIFIED SITE, UNSPECIFIED RHEUMATOID FACTOR PRESENCE: ICD-10-CM

## 2019-05-13 RX ORDER — TRAMADOL HYDROCHLORIDE 50 MG/1
TABLET ORAL
Qty: 30 TAB | Refills: 0 | Status: SHIPPED | OUTPATIENT
Start: 2019-05-13 | End: 2019-06-09 | Stop reason: SDUPTHER

## 2019-05-14 NOTE — TELEPHONE ENCOUNTER
Phoned in: 
 traMADol (ULTRAM) 50 mg tablet Sig: TAKE 1 TABLET BY MOUTH AT BEDTIME AS NEEDED Disp:  30 Tab    Refills:  0

## 2019-05-16 ENCOUNTER — TELEPHONE (OUTPATIENT)
Dept: INTERNAL MEDICINE CLINIC | Age: 64
End: 2019-05-16

## 2019-05-16 NOTE — TELEPHONE ENCOUNTER
HDF Kiana Paula, nurse -[phone] [address] [place] [address] [phone] Need any EKG that we have done to compare Pt having surgery 5/23 Please fax ASAP

## 2019-06-09 DIAGNOSIS — M06.9 RHEUMATOID ARTHRITIS, INVOLVING UNSPECIFIED SITE, UNSPECIFIED RHEUMATOID FACTOR PRESENCE: ICD-10-CM

## 2019-06-10 RX ORDER — TRAMADOL HYDROCHLORIDE 50 MG/1
TABLET ORAL
Qty: 30 TAB | Refills: 0 | OUTPATIENT
Start: 2019-06-10 | End: 2019-07-12 | Stop reason: SDUPTHER

## 2019-06-10 NOTE — TELEPHONE ENCOUNTER
Orders Placed This Encounter  traMADol (ULTRAM) 50 mg tablet Sig: TAKE 1 TABLET BY MOUTH AT BEDTIME AS NEEDED Dispense:  30 Tab Refill:  0 Not to exceed 4 additional fills before 11/09/2019. Needs appt for more refills. The above controlled substance refill was called into patients pharmacy - [facility] - authorized by Dr. Marko Meier.

## 2019-06-19 RX ORDER — ESCITALOPRAM OXALATE 20 MG/1
20 TABLET ORAL DAILY
Qty: 90 TAB | Refills: 1 | Status: SHIPPED | OUTPATIENT
Start: 2019-06-19 | End: 2019-12-12 | Stop reason: SDUPTHER

## 2019-06-19 RX ORDER — LEVOTHYROXINE SODIUM 125 UG/1
TABLET ORAL
Qty: 90 TAB | Refills: 0 | Status: SHIPPED | OUTPATIENT
Start: 2019-06-19 | End: 2020-01-16

## 2019-07-09 ENCOUNTER — OFFICE VISIT (OUTPATIENT)
Dept: SURGERY | Age: 64
End: 2019-07-09

## 2019-07-09 VITALS
RESPIRATION RATE: 18 BRPM | SYSTOLIC BLOOD PRESSURE: 101 MMHG | TEMPERATURE: 98.3 F | HEART RATE: 70 BPM | DIASTOLIC BLOOD PRESSURE: 62 MMHG | BODY MASS INDEX: 41.01 KG/M2 | OXYGEN SATURATION: 95 % | HEIGHT: 70 IN | WEIGHT: 286.5 LBS

## 2019-07-09 DIAGNOSIS — R14.0 ABDOMINAL BLOATING: Primary | ICD-10-CM

## 2019-07-09 PROBLEM — Z89.429 STATUS POST AMPUTATION OF LESSER TOE (HCC): Status: ACTIVE | Noted: 2019-07-09

## 2019-07-09 NOTE — PROGRESS NOTES
1. Have you been to the ER, urgent care clinic since your last visit? Hospitalized since your last visit? No    2. Have you seen or consulted any other health care providers outside of the [address] [place] [address] since your last visit? Include any pap smears or colon screening.  No

## 2019-07-09 NOTE — PATIENT INSTRUCTIONS
Gas and Bloating: Care Instructions Your Care Instructions Gas and bloating can be uncomfortable and embarrassing problems. All people pass gas, but some people produce more gas than others, sometimes enough to cause distress. It is normal to pass gas from 6 to 20 times per day. Excess gas usually is not caused by a serious health problem. Gas and bloating usually are caused by something you eat or drink, including some food supplements and medicines. Gas and bloating are usually harmless and go away without treatment. However, changing your diet can help end the problem. Some over-the-counter medicines can help prevent gas and relieve bloating. Follow-up care is a key part of your treatment and safety. Be sure to make and go to all appointments, and call your doctor if you are having problems. It's also a good idea to know your test results and keep a list of the medicines you take. How can you care for yourself at home? · Keep a food diary if you think a food gives you gas. Write down what you eat or drink. Also record when you get gas. If you notice that a food seems to cause your gas each time, avoid it and see if the gas goes away. Examples of foods that cause gas include: ? Fried and fatty foods. ? Beans. ? Vegetables such as artichokes, asparagus, broccoli, brussels sprouts, cabbage, cauliflower, cucumbers, green peppers, onions, peas, radishes, and raw potatoes. ? Fruits such as apricots, bananas, melons, peaches, pears, prunes, and raw apples. ? Wheat and wheat bran. · Soak dry beans in water overnight, then dump the water and cook the soaked beans in new water. This can help prevent gas and bloating. · If you have problems with lactose, avoid dairy products such as milk and cheese. · Try not to swallow air. Do not drink through a straw, gulp your food, or chew gum. · Take an over-the-counter medicine. Read and follow all instructions on the label. ? Food enzymes, such as Beano, can be added to gas-producing foods to prevent gas. ? Antacids, such as Maalox Anti-Gas and Mylanta Gas, can relieve bloating by making you burp. Be careful when you take over-the-counter antacid medicines. Many of these medicines have aspirin in them. Read the label to make sure that you are not taking more than the recommended dose. Too much aspirin can be harmful. ? Activated charcoal tablets, such as CharcoCaps, may decrease odor from gas you pass. ? If you have problems with lactose, you can take medicines such as Dairy Ease and Lactaid with dairy products to prevent gas and bloating. · Get some exercise regularly. When should you call for help? Call 911 anytime you think you may need emergency care. For example, call if: 
  · You have gas and signs of a heart attack, such as: 
? Chest pain or pressure. ? Sweating. ? Shortness of breath. ? Nausea or vomiting. ? Pain that spreads from the chest to the neck, jaw, or one or both shoulders or arms. ? Dizziness or lightheadedness. ? A fast or uneven pulse. After calling 911, chew 1 adult-strength aspirin. Wait for an ambulance. Do not try to drive yourself.  
 Call your doctor now or seek immediate medical care if: 
  · You have severe belly pain.  
  · You have blood in your stool.  
 Watch closely for changes in your health, and be sure to contact your doctor if: 
  · You have blood or pus in your urine.  
  · Your urine is cloudy or smells bad.  
  · You are burping and have trouble swallowing.  
  · You feel bloated and have swelling in your belly.  
  · You do not get better as expected. Where can you learn more? Go to http://theresa-sandra.info/. Enter W405 in the search box to learn more about \"Gas and Bloating: Care Instructions. \" Current as of: September 23, 2018 Content Version: 11.9 © 9356-7240 [employer].  Care instructions adapted under license by [address] (which disclaims liability or warranty for this information). If you have questions about a medical condition or this instruction, always ask your healthcare professional. [address] any warranty or liability for your use of this information.

## 2019-07-12 DIAGNOSIS — M06.9 RHEUMATOID ARTHRITIS, INVOLVING UNSPECIFIED SITE, UNSPECIFIED RHEUMATOID FACTOR PRESENCE: ICD-10-CM

## 2019-07-12 RX ORDER — TRAMADOL HYDROCHLORIDE 50 MG/1
50 TABLET ORAL
Qty: 30 TAB | Refills: 0 | OUTPATIENT
Start: 2019-07-12 | End: 2019-07-18 | Stop reason: SDUPTHER

## 2019-07-15 NOTE — TELEPHONE ENCOUNTER
Orders Placed This Encounter  traMADol (ULTRAM) 50 mg tablet Sig: Take 1 Tab by mouth nightly as needed for Pain for up to 30 days. Max Daily Amount: 50 mg. Dispense:  30 Tab Refill:  0 Not to exceed 4 additional fills before 11/09/2019. Needs appt for more refills. The above controlled substance refill was called into patients pharmacy - [facility]/ - authorized by Dr. Harriet Wadsworth.

## 2019-07-16 NOTE — PROGRESS NOTES
Subjective:      Mami Parrish is a 59 y.o. female presents for postop care 15 months following laparoscopic JJ reconstruction. Appetite is good. Eating a regular diet without difficulty. Bowel movements are regular. She notes recurrence of lower abdominal pain (crampy) improved with flatus/bowel movement. No emesis, regurgitation      Objective:     Visit Vitals  /62 (BP 1 Location: Left arm, BP Patient Position: Sitting)   Pulse 70   Temp 98.3 °F (36.8 °C) (Oral)   Resp 18   Ht 5' 10\" (1.778 m)   Wt 286 lb 8 oz (130 kg)   SpO2 95%   BMI 41.11 kg/m²       General:  alert, cooperative, no distress, appears stated age, morbidly obese   Abdomen: soft, no hernias, mild lower abdominal pain with palpation   Incision:   well healed     Assessment:     Abdominal pain; prior gastric bypass and 15 months from [address] reconstruction for intussusception. Plan:     1. Continue current medications. 2. Bariatric diet as tolerated. 3. Will order CT scan abdomen/pelvis.

## 2019-07-18 ENCOUNTER — OFFICE VISIT (OUTPATIENT)
Dept: INTERNAL MEDICINE CLINIC | Age: 64
End: 2019-07-18

## 2019-07-18 VITALS
WEIGHT: 286 LBS | RESPIRATION RATE: 14 BRPM | SYSTOLIC BLOOD PRESSURE: 118 MMHG | DIASTOLIC BLOOD PRESSURE: 73 MMHG | HEIGHT: 70 IN | OXYGEN SATURATION: 96 % | HEART RATE: 73 BPM | BODY MASS INDEX: 40.94 KG/M2 | TEMPERATURE: 98.2 F

## 2019-07-18 DIAGNOSIS — J45.20 MILD INTERMITTENT ASTHMA, UNSPECIFIED WHETHER COMPLICATED: ICD-10-CM

## 2019-07-18 DIAGNOSIS — M06.9 RHEUMATOID ARTHRITIS, INVOLVING UNSPECIFIED SITE, UNSPECIFIED RHEUMATOID FACTOR PRESENCE: ICD-10-CM

## 2019-07-18 DIAGNOSIS — E03.9 ACQUIRED HYPOTHYROIDISM: Primary | ICD-10-CM

## 2019-07-18 DIAGNOSIS — R06.09 DOE (DYSPNEA ON EXERTION): ICD-10-CM

## 2019-07-18 RX ORDER — TRAMADOL HYDROCHLORIDE 50 MG/1
50 TABLET ORAL
Qty: 60 TAB | Refills: 0 | Status: SHIPPED | OUTPATIENT
Start: 2019-07-18 | End: 2019-08-17

## 2019-07-19 ENCOUNTER — HOSPITAL ENCOUNTER (OUTPATIENT)
Dept: CT IMAGING | Age: 64
Discharge: HOME | End: 2019-07-19
Payer: COMMERCIAL

## 2019-07-19 DIAGNOSIS — R14.0 ABDOMINAL BLOATING: ICD-10-CM

## 2019-07-19 PROCEDURE — 74177 CT ABD & PELVIS W/CONTRAST: CPT

## 2019-07-19 RX ORDER — SODIUM CHLORIDE 0.9 % (FLUSH) 0.9 %
10 SYRINGE (ML) INJECTION
Status: COMPLETED | OUTPATIENT
Start: 2019-07-19 | End: 2019-07-19

## 2019-07-19 RX ADMIN — Medication 10 ML: at 09:45

## 2019-07-19 NOTE — PROGRESS NOTES
HPI:  Tomas Conn is a 59y.o. year old female who is here for a routine visit:    Presents for follow-up visit. She is been generally doing well. She has noted over the last few weeks some increased dyspnea on exertion when walking to work. She denies any cough. She does note some wheeze. She is tried using albuterol on one occasion with limited improvement. She saw the surgeon recently to evaluate for recurrent intussusception. Has a CT scan scheduled for tomorrow. After that she will reach out to me for further evaluation. Past Medical History:   Diagnosis Date    Adverse effect of anesthesia     HARD TO WAKE UP FROM THE SURGERY     Anxiety state, unspecified 6/23/2010    Asthma     Depression     DVT (deep venous thrombosis) ([facility] [address] 03/1997    LT DVT    Left atrial enlargement     RIGHT ATR. ENL.  Meniere's disease     Morbid obesity ([facility] [address]     Osteoarthritis     Psychiatric disorder      PANIC ATTACK     Pulmonary embolism ([facility] [address] 03/1997    MULTIPLE BILA     Thyroid disease     HYPO    Unspecified asthma(493.90) 6/23/2010    Vitamin D deficiency        Past Surgical History:   Procedure Laterality Date    ABDOMEN SURGERY PROC UNLISTED      ENDOMETRIAL CRYOABLATION  1996    GASTRIC BYPASS,OBESE<150CM TONIA-EN-Y      HX AMPUTATION      RIGHT FOOT 4 TH TOE    HX APPENDECTOMY      HX BREAST BIOPSY  1998     RT BREAST ,  BENIIGN    HX CERVICAL LAMINECTOMY  07/02/2015    C5-C6    HX CHOLECYSTECTOMY      HX COLONOSCOPY  05/06/2016    Dr. Walls Signs - 8 yr f/u    HX GASTRIC BYPASS  2002    HX GYN      ABLATION     HX GYN      HX KNEE ARTHROSCOPY      RIGHT     HX ORTHOPAEDIC      REMOVAL COCCYX    HX SMALL BOWEL RESECTION  04/23/2018    by Dr Rachid RIVERO         Prior to Admission medications    Medication Sig Start Date End Date Taking?  Authorizing Provider   traMADol (ULTRAM) 50 mg tablet Take 1 Tab by mouth every eight (8) hours as needed for Pain for up to 30 days. Max Daily Amount: 150 mg. 7/18/19 8/17/19 Yes Andi Weinstein MD   escitalopram oxalate (LEXAPRO) 20 mg tablet TAKE 1 TAB BY MOUTH DAILY. INDICATIONS: ANXIOUSNESS ASSOCIATED WITH DEPRESSION 6/19/19  Yes Torin Aponte III, MD   levothyroxine (SYNTHROID) 125 mcg tablet TAKE ONE TABLET BY MOUTH ONE TIME DAILY BEFORE BREAKFAST 6/19/19  Yes Torin Aponte III, MD   furosemide (LASIX) 40 mg tablet Take 1 Tab by mouth daily. 3/28/19  Yes Andi Weinstein MD   albuterol (PROAIR HFA) 90 mcg/actuation inhaler Take 2 Puffs by inhalation every six (6) hours as needed for Wheezing. Indications: Asthma Attack 3/28/19  Yes Andi Weinstein MD   acetaminophen (TYLENOL EXTRA STRENGTH) 500 mg tablet Take 1,000 mg by mouth every six (6) hours as needed for Pain. Indications: Pain   Yes Provider, Historical   VIT C/E/ZN/COPPR/LUTEIN/ZEAXAN (PRESERVISION AREDS 2 PO) Take  by mouth. Yes Provider, Historical   ALPRAZolam (XANAX) 0.5 mg tablet Take 1 Tab by mouth three (3) times daily as needed for Sleep or Anxiety. Indications: ANXIETY 2/22/17  Yes Andi Weinstein MD   aspirin delayed-release 81 mg tablet Take  by mouth daily. Yes Provider, Historical   leucovorin calcium (WELLCOVORIN) 5 mg tablet Take  by mouth every seven (7) days. Yes Provider, Historical   fexofenadine (ALLEGRA) 180 mg tablet Take 180 mg by mouth nightly. 8/6/12  Yes Andi Weinstein MD   methotrexate (RHEUMATREX) 2.5 mg tablet Take 8 Tabs by mouth every Sunday. 12/19/11  Yes Provider, Historical   infliximab (REMICADE) 100 mg injection 5 mg/kg by IntraVENous route once. [id number]   Yes Provider, Historical   folic acid (FOLVITE) 1 mg tablet Take 4 mg by mouth daily.    Yes Provider, Historical       Social History     Socioeconomic History    Marital status:      Spouse name: Not on file    Number of children: Not on file    Years of education: Not on file    Highest education level: Not on file   Occupational History    Not on file   Social Needs    Financial resource strain: Not on file    Food insecurity:     Worry: Not on file     Inability: Not on file    Transportation needs:     Medical: Not on file     Non-medical: Not on file   Tobacco Use    Smoking status: Never Smoker    Smokeless tobacco: Never Used   Substance and Sexual Activity    Alcohol use: No     Alcohol/week: 0.0 standard drinks    Drug use: No    Sexual activity: Yes     Partners: Male   Lifestyle    Physical activity:     Days per week: Not on file     Minutes per session: Not on file    Stress: Not on file   Relationships    Social connections:     Talks on phone: Not on file     Gets together: Not on file     Attends Worship service: Not on file     Active member of club or organization: Not on file     Attends meetings of clubs or organizations: Not on file     Relationship status: Not on file    Intimate partner violence:     Fear of current or ex partner: Not on file     Emotionally abused: Not on file     Physically abused: Not on file     Forced sexual activity: Not on file   Other Topics Concern    Not on file   Social History Narrative    Not on file          ROS  Been seeing rheumatology regularly. He is getting lab work done with them. Is using Ultram at nighttime to help with sleeping otherwise she gets diffuse aches and pains.     Visit Vitals  /73   Pulse 73   Temp 98.2 °F (36.8 °C) (Oral)   Resp 14   Ht 5' 10\" (1.778 m)   Wt 286 lb (129.7 kg)   SpO2 96%   BMI 41.04 kg/m²         Physical Exam   Physical Examination: General appearance - alert, well appearing, and in no distress  Mouth - mucous membranes moist, pharynx normal without lesions  Neck - supple, no significant adenopathy  Lymphatics - no palpable lymphadenopathy, no hepatosplenomegaly  Chest - clear to auscultation, no wheezes, rales or rhonchi, symmetric air entry  Heart - normal rate, regular rhythm, normal S1, S2, no murmurs, rubs, clicks or gallops  Abdomen - soft, nontender, nondistended, no masses or organomegaly  Extremities - peripheral pulses normal, no pedal edema, no clubbing or cyanosis      Assessment/Plan:  Diagnoses and all orders for this visit:    1. Acquired hypothyroidism -appears euthyroid. Will check levels with her next blood draw for rheumatology.  -     TSH AND FREE T4    2. Rheumatoid arthritis, involving unspecified site, unspecified rheumatoid factor presence [facility]) -rheumatology. Will continue 1 tramadol per night but may use an extra during the day if needed. -     traMADol (ULTRAM) 50 mg tablet; Take 1 Tab by mouth every eight (8) hours as needed for Pain for up to 30 days. Max Daily Amount: 150 mg.    3. Mild intermittent asthma, unspecified whether complicated -appears stable. Will try albuterol prior to walking. 4. MUNOZ (dyspnea on exertion) -if it persist and her CT scan is normal, consider stress test to evaluate. Follow-up and Dispositions    · Return in about 6 months (around 1/18/2020), or if symptoms worsen or fail to improve. Advised her to call back or return to office if symptoms worsen/change/persist.  Discussed expected course/resolution/complications of diagnosis in detail with patient. Medication risks/benefits/costs/interactions/alternatives discussed with patient. She was given an after visit summary which includes diagnoses, current medications, & vitals. She expressed understanding with the diagnosis and plan.

## 2019-07-25 ENCOUNTER — TELEPHONE (OUTPATIENT)
Dept: SURGERY | Age: 64
End: 2019-07-25

## 2019-07-31 ENCOUNTER — OFFICE VISIT (OUTPATIENT)
Dept: INTERNAL MEDICINE CLINIC | Age: 64
End: 2019-07-31

## 2019-07-31 VITALS
HEIGHT: 70 IN | RESPIRATION RATE: 18 BRPM | DIASTOLIC BLOOD PRESSURE: 64 MMHG | TEMPERATURE: 98.1 F | SYSTOLIC BLOOD PRESSURE: 122 MMHG | BODY MASS INDEX: 40.94 KG/M2 | WEIGHT: 286 LBS | OXYGEN SATURATION: 95 % | HEART RATE: 75 BPM

## 2019-07-31 DIAGNOSIS — N28.1 RENAL CYST: ICD-10-CM

## 2019-07-31 DIAGNOSIS — Z86.711 HISTORY OF PULMONARY EMBOLISM: ICD-10-CM

## 2019-07-31 DIAGNOSIS — R06.09 DOE (DYSPNEA ON EXERTION): Primary | ICD-10-CM

## 2019-07-31 NOTE — TELEPHONE ENCOUNTER
Reviewed CT findings with patient (no acute findings, no intussusception). She will follow-up with PCP and GI.

## 2019-08-01 ENCOUNTER — HOSPITAL ENCOUNTER (OUTPATIENT)
Dept: CT IMAGING | Age: 64
Discharge: HOME | End: 2019-08-01
Attending: INTERNAL MEDICINE
Payer: COMMERCIAL

## 2019-08-01 ENCOUNTER — TELEPHONE (OUTPATIENT)
Dept: INTERNAL MEDICINE CLINIC | Age: 64
End: 2019-08-01

## 2019-08-01 DIAGNOSIS — R06.02 SOB (SHORTNESS OF BREATH): ICD-10-CM

## 2019-08-01 DIAGNOSIS — R79.89 POSITIVE D DIMER: ICD-10-CM

## 2019-08-01 DIAGNOSIS — Z86.711 HISTORY OF PULMONARY EMBOLISM: ICD-10-CM

## 2019-08-01 DIAGNOSIS — R79.89 POSITIVE D DIMER: Primary | ICD-10-CM

## 2019-08-01 LAB
BASOPHILS # BLD AUTO: 0.1 X10E3/UL (ref 0–0.2)
BASOPHILS NFR BLD AUTO: 2 %
D DIMER PPP FEU-MCNC: 1.03 MG/L FEU (ref 0–0.49)
EOSINOPHIL # BLD AUTO: 0.2 X10E3/UL (ref 0–0.4)
EOSINOPHIL NFR BLD AUTO: 4 %
ERYTHROCYTE [DISTWIDTH] IN BLOOD BY AUTOMATED COUNT: 16.7 % (ref 12.3–15.4)
HCT VFR BLD AUTO: 36.6 % (ref 34–46.6)
HGB BLD-MCNC: 11.5 G/DL (ref 11.1–15.9)
IMM GRANULOCYTES # BLD AUTO: 0 X10E3/UL (ref 0–0.1)
IMM GRANULOCYTES NFR BLD AUTO: 0 %
LYMPHOCYTES # BLD AUTO: 1.9 X10E3/UL (ref 0.7–3.1)
LYMPHOCYTES NFR BLD AUTO: 41 %
MCH RBC QN AUTO: 26.3 PG (ref 26.6–33)
MCHC RBC AUTO-ENTMCNC: 31.4 G/DL (ref 31.5–35.7)
MCV RBC AUTO: 84 FL (ref 79–97)
MONOCYTES # BLD AUTO: 0.2 X10E3/UL (ref 0.1–0.9)
MONOCYTES NFR BLD AUTO: 5 %
NEUTROPHILS # BLD AUTO: 2.3 X10E3/UL (ref 1.4–7)
NEUTROPHILS NFR BLD AUTO: 48 %
NT-PROBNP SERPL-MCNC: 112 PG/ML (ref 0–287)
PLATELET # BLD AUTO: 267 X10E3/UL (ref 150–450)
RBC # BLD AUTO: 4.37 X10E6/UL (ref 3.77–5.28)
WBC # BLD AUTO: 4.7 X10E3/UL (ref 3.4–10.8)

## 2019-08-01 PROCEDURE — 74011636320 HC RX REV CODE- 636/320: Performed by: RADIOLOGY

## 2019-08-01 PROCEDURE — 74011000258 HC RX REV CODE- 258: Performed by: RADIOLOGY

## 2019-08-01 PROCEDURE — 71275 CT ANGIOGRAPHY CHEST: CPT

## 2019-08-01 RX ORDER — SODIUM CHLORIDE 0.9 % (FLUSH) 0.9 %
10 SYRINGE (ML) INJECTION
Status: COMPLETED | OUTPATIENT
Start: 2019-08-01 | End: 2019-08-01

## 2019-08-01 RX ADMIN — IOPAMIDOL 85 ML: 755 INJECTION, SOLUTION INTRAVENOUS at 15:54

## 2019-08-01 RX ADMIN — SODIUM CHLORIDE 100 ML: 900 INJECTION, SOLUTION INTRAVENOUS at 15:55

## 2019-08-01 RX ADMIN — Medication 10 ML: at 15:54

## 2019-08-01 NOTE — PROGRESS NOTES
HPI:  Severa Asp is a 59y.o. year old female who is here for follow-up visit for dyspnea. She was seen here recently and awaiting the results of the CT of the abdomen. This was done because of her history of intussusception. Her CT scan was normal with the exception of a renal cyst that was unchanged and otherwise normal.  She has dyspnea on exertion with walking to her work. She is been trying albuterol and it makes little difference. No fevers or chills. Some cough but no sputum. No nausea vomiting. Some tightness in the upper chest.  She is previously had a history of pulmonary embolism. Past Medical History:   Diagnosis Date    Adverse effect of anesthesia     HARD TO WAKE UP FROM THE SURGERY     Anxiety state, unspecified 6/23/2010    Asthma     Depression     DVT (deep venous thrombosis) ([facility] [address] 03/1997    LT DVT    Left atrial enlargement     RIGHT ATR. ENL.  Meniere's disease     Morbid obesity ([facility] [address]     Osteoarthritis     Psychiatric disorder      PANIC ATTACK     Pulmonary embolism ([facility] [address] 03/1997    MULTIPLE BILA     Thyroid disease     HYPO    Unspecified asthma(493.90) 6/23/2010    Vitamin D deficiency        Past Surgical History:   Procedure Laterality Date    ABDOMEN SURGERY PROC UNLISTED      ENDOMETRIAL CRYOABLATION  1996    GASTRIC BYPASS,OBESE<150CM TONIA-EN-Y      HX AMPUTATION      RIGHT FOOT 4 TH TOE    HX APPENDECTOMY      HX BREAST BIOPSY  1998     RT BREAST ,  BENIIGN    HX CERVICAL LAMINECTOMY  07/02/2015    C5-C6    HX CHOLECYSTECTOMY      HX COLONOSCOPY  05/06/2016    Dr. Janay Tapia - 8 yr f/u    HX GASTRIC BYPASS  2002    HX GYN      ABLATION     HX GYN      HX KNEE ARTHROSCOPY      RIGHT     HX ORTHOPAEDIC      REMOVAL COCCYX    HX SMALL BOWEL RESECTION  04/23/2018    by Dr Dalila RIVERO         Prior to Admission medications    Medication Sig Start Date End Date Taking?  Authorizing Provider traMADol (ULTRAM) 50 mg tablet Take 1 Tab by mouth every eight (8) hours as needed for Pain for up to 30 days. Max Daily Amount: 150 mg. 7/18/19 8/17/19 Yes Ronni Vieira MD   escitalopram oxalate (LEXAPRO) 20 mg tablet TAKE 1 TAB BY MOUTH DAILY. INDICATIONS: ANXIOUSNESS ASSOCIATED WITH DEPRESSION 6/19/19  Yes Adrianna Barbosa III, MD   levothyroxine (SYNTHROID) 125 mcg tablet TAKE ONE TABLET BY MOUTH ONE TIME DAILY BEFORE BREAKFAST 6/19/19  Yes Adrianna Barbosa III, MD   furosemide (LASIX) 40 mg tablet Take 1 Tab by mouth daily. 3/28/19  Yes Ronni Vieira MD   albuterol (PROAIR HFA) 90 mcg/actuation inhaler Take 2 Puffs by inhalation every six (6) hours as needed for Wheezing. Indications: Asthma Attack 3/28/19  Yes Ronni Vieira MD   acetaminophen (TYLENOL EXTRA STRENGTH) 500 mg tablet Take 1,000 mg by mouth every six (6) hours as needed for Pain. Indications: Pain   Yes Provider, Historical   VIT C/E/ZN/COPPR/LUTEIN/ZEAXAN (PRESERVISION AREDS 2 PO) Take  by mouth two (2) times a day. Yes Provider, Historical   ALPRAZolam (XANAX) 0.5 mg tablet Take 1 Tab by mouth three (3) times daily as needed for Sleep or Anxiety. Indications: ANXIETY 2/22/17  Yes Ronni Vieira MD   aspirin delayed-release 81 mg tablet Take  by mouth daily. Yes Provider, Historical   leucovorin calcium (WELLCOVORIN) 5 mg tablet Take  by mouth every seven (7) days. Yes Provider, Historical   fexofenadine (ALLEGRA) 180 mg tablet Take 180 mg by mouth nightly. 8/6/12  Yes Ronni Vieira MD   methotrexate (RHEUMATREX) 2.5 mg tablet Take 8 Tabs by mouth every Sunday. 12/19/11  Yes Provider, Historical   infliximab (REMICADE) 100 mg injection 5 mg/kg by IntraVENous route once. [id number]   Yes Provider, Historical   folic acid (FOLVITE) 1 mg tablet Take 4 mg by mouth daily.    Yes Provider, Historical       Social History     Socioeconomic History    Marital status:      Spouse name: Not on file    Number of children: Not on file    Years of education: Not on file    Highest education level: Not on file   Occupational History    Not on file   Social Needs    Financial resource strain: Not on file    Food insecurity:     Worry: Not on file     Inability: Not on file    Transportation needs:     Medical: Not on file     Non-medical: Not on file   Tobacco Use    Smoking status: Never Smoker    Smokeless tobacco: Never Used   Substance and Sexual Activity    Alcohol use: No     Alcohol/week: 0.0 standard drinks    Drug use: No    Sexual activity: Yes     Partners: Male   Lifestyle    Physical activity:     Days per week: Not on file     Minutes per session: Not on file    Stress: Not on file   Relationships    Social connections:     Talks on phone: Not on file     Gets together: Not on file     Attends [employer] service: Not on file     Active member of club or organization: Not on file     Attends meetings of clubs or organizations: Not on file     Relationship status: Not on file    Intimate partner violence:     Fear of current or ex partner: Not on file     Emotionally abused: Not on file     Physically abused: Not on file     Forced sexual activity: Not on file   Other Topics Concern    Not on file   Social History Narrative    Not on file          ROS  Per HPI. NO PND or orthopnea. No exertional chest pain.      Visit Vitals  /64   Pulse 75   Temp 98.1 °F (36.7 °C)   Resp 18   Ht 5' 10\" (1.778 m)   Wt 286 lb (129.7 kg)   SpO2 95%   BMI 41.04 kg/m²         Physical Exam   Physical Examination: General appearance - alert, well appearing, and in no distress  Mouth - mucous membranes moist, pharynx normal without lesions  Neck - supple, no significant adenopathy  Lymphatics - no palpable lymphadenopathy, no hepatosplenomegaly  Chest - clear to auscultation, no wheezes, rales or rhonchi, symmetric air entry  Heart - normal rate and regular rhythm  Abdomen - soft, nontender, nondistended, no masses or organomegaly  Extremities - peripheral pulses normal, no pedal edema, no clubbing or cyanosis      Assessment/Plan:  Diagnoses and all orders for this visit:    1. MUNOZ (dyspnea on exertion) -unclear etiology. Concern for possible clot versus cardiac causes. She does have a history of asthma but that appears to be stable and there is been no improvement with bronchodilators. Will check evaluation for the above. If d-dimer is positive, obtain a CT for clots. If BNP is elevated evaluate for cardiac sources.  -     NT-PRO BNP  -     D DIMER  -     CBC WITH AUTOMATED DIFF    2. History of pulmonary embolism    3. Renal cyst -reassured. Advised her to call back or return to office if symptoms worsen/change/persist.  Discussed expected course/resolution/complications of diagnosis in detail with patient. Medication risks/benefits/costs/interactions/alternatives discussed with patient. She was given an after visit summary which includes diagnoses, current medications, & vitals. She expressed understanding with the diagnosis and plan.

## 2019-08-01 NOTE — TELEPHONE ENCOUNTER
Spoke with pt in ref to lab results. Pt was seen in office on 7/31 for SOB. Does have hx of PE. D dimer is elevated. Per [facility], will obtain STAT CTA. Pt to arrive [facility] at 4 pm today. Verbalized understanding. Orders Placed This Encounter  CTA CHEST W OR W WO CONT Standing Status:   Future Standing Expiration Date:   9/1/2020 Order Specific Question:   Is Patient Allergic to Contrast Dye? Answer:   Unknown Order Specific Question:   STAT Creatinine as indicated Answer:    No

## 2019-08-01 NOTE — TELEPHONE ENCOUNTER
----- Message from David Haynes MD sent at 8/1/2019 12:26 PM EDT ----- Urgent CT of lung - PE study.

## 2019-08-07 ENCOUNTER — TELEPHONE (OUTPATIENT)
Dept: INTERNAL MEDICINE CLINIC | Age: 64
End: 2019-08-07

## 2019-08-07 NOTE — LETTER
8/7/2019 Ms. Georgeanna Libman [address] [facility] [id number] To whom it may concern: 
 
Please allow Mrs. Lilian Martin to park as close to her office as possible because of multiple medical problems at this time. Thank you for your consideration. Sincerely, Germaine Berry MD

## 2019-08-07 NOTE — TELEPHONE ENCOUNTER
Alvarado Maharaj (Self) [phone] (H) Pt is requesting a call back regarding a my chart mess she sent to dr Martínez.

## 2019-09-03 LAB — EF %, EXTERNAL: NORMAL

## 2019-09-29 RX ORDER — FUROSEMIDE 40 MG/1
TABLET ORAL
Qty: 90 TAB | Refills: 1 | Status: SHIPPED | OUTPATIENT
Start: 2019-09-29 | End: 2020-03-31

## 2019-10-04 LAB — CREATININE, EXTERNAL: 0.7

## 2019-10-09 DIAGNOSIS — M06.9 RHEUMATOID ARTHRITIS, INVOLVING UNSPECIFIED SITE, UNSPECIFIED RHEUMATOID FACTOR PRESENCE: Primary | ICD-10-CM

## 2019-10-09 RX ORDER — TRAMADOL HYDROCHLORIDE 50 MG/1
TABLET ORAL
Qty: 60 TAB | Refills: 0 | OUTPATIENT
Start: 2019-10-09 | End: 2019-11-08

## 2019-10-09 NOTE — TELEPHONE ENCOUNTER
Orders Placed This Encounter  traMADol (ULTRAM) 50 mg tablet Sig: TAKE 1 TABLET BY MOUTH EVERY 8 HOURS AS NEEDED FOR PAIN Dispense:  60 Tab Refill:  0 Not to exceed 5 additional fills before 01/14/2020. The above controlled substance refill was called into patients pharmacy -[facility]/ - authorized by Dr. Aida Kelley.

## 2019-11-08 ENCOUNTER — HOSPITAL ENCOUNTER (OUTPATIENT)
Dept: GENERAL RADIOLOGY | Age: 64
Discharge: HOME | End: 2019-11-08
Payer: COMMERCIAL

## 2019-11-08 DIAGNOSIS — R06.02 SHORTNESS OF BREATH: ICD-10-CM

## 2019-11-08 PROCEDURE — 71046 X-RAY EXAM CHEST 2 VIEWS: CPT

## 2019-11-22 DIAGNOSIS — M06.9 RHEUMATOID ARTHRITIS, INVOLVING UNSPECIFIED SITE, UNSPECIFIED RHEUMATOID FACTOR PRESENCE: Primary | ICD-10-CM

## 2019-11-22 RX ORDER — TRAMADOL HYDROCHLORIDE 50 MG/1
TABLET ORAL
Qty: 60 TAB | Refills: 0 | Status: SHIPPED | OUTPATIENT
Start: 2019-11-22 | End: 2020-01-23

## 2019-12-12 RX ORDER — ESCITALOPRAM OXALATE 20 MG/1
20 TABLET ORAL DAILY
Qty: 90 TAB | Refills: 1 | Status: SHIPPED | OUTPATIENT
Start: 2019-12-12 | End: 2020-06-24

## 2020-01-16 RX ORDER — LEVOTHYROXINE SODIUM 125 UG/1
TABLET ORAL
Qty: 90 TAB | Refills: 0 | Status: SHIPPED | OUTPATIENT
Start: 2020-01-16 | End: 2020-04-13

## 2020-01-22 DIAGNOSIS — M06.9 RHEUMATOID ARTHRITIS, INVOLVING UNSPECIFIED SITE, UNSPECIFIED RHEUMATOID FACTOR PRESENCE: ICD-10-CM

## 2020-01-23 RX ORDER — TRAMADOL HYDROCHLORIDE 50 MG/1
TABLET ORAL
Qty: 60 TAB | Refills: 0 | Status: SHIPPED | OUTPATIENT
Start: 2020-01-23 | End: 2020-02-06

## 2020-01-30 ENCOUNTER — OFFICE VISIT (OUTPATIENT)
Dept: SURGERY | Age: 65
End: 2020-01-30

## 2020-01-30 VITALS
SYSTOLIC BLOOD PRESSURE: 125 MMHG | OXYGEN SATURATION: 98 % | TEMPERATURE: 98 F | WEIGHT: 293 LBS | RESPIRATION RATE: 16 BRPM | HEIGHT: 70 IN | HEART RATE: 65 BPM | DIASTOLIC BLOOD PRESSURE: 71 MMHG | BODY MASS INDEX: 41.95 KG/M2

## 2020-01-30 DIAGNOSIS — K59.00 CONSTIPATION, UNSPECIFIED CONSTIPATION TYPE: Primary | ICD-10-CM

## 2020-01-30 DIAGNOSIS — R10.11 RUQ PAIN: ICD-10-CM

## 2020-01-30 RX ORDER — BUDESONIDE AND FORMOTEROL FUMARATE DIHYDRATE 80; 4.5 UG/1; UG/1
AEROSOL RESPIRATORY (INHALATION)
COMMUNITY
Start: 2020-01-03

## 2020-01-30 RX ORDER — OMEPRAZOLE 40 MG/1
CAPSULE, DELAYED RELEASE ORAL
COMMUNITY
Start: 2020-01-03

## 2020-01-30 NOTE — PROGRESS NOTES
HISTORY OF PRESENT ILLNESS  Ferny Guerra is a 59 y.o. female. Chronic abdominal complaints    EGD with Dr. Keven Ko 10/2019: Gr A esophagitis, anatomy c/w RYGB  Last colonoscopy 2 years ago    Was fine until last march    C/o SOB and palpitations  No difference with eating  Lung and cardiac w/o normal  Now taking Symbicort    Chronic constipation with change in caliber of stool and irr. Feels like when she had PEs in 1997    Wt 280s - 290s    Reviewed CTs from   4/16/2018 when she had the intussusception and from   7/2019 post surgery. abd wall is intact. Transverse colon in area of complaint. On both studies there maybe some thickening and narrowing of the distal transverse colon. This may just be peristalsis but present on both studies. ____________________________________________________________________________  Patient presents with:  Abdominal Pain: Patient seen today to be evaluated for bulge in abdomen and SOB, referred by a friend. Shortness of Breath    /71 (BP 1 Location: Right arm, BP Patient Position: Sitting)   Pulse 65   Temp 98 °F (36.7 °C) (Oral)   Resp 16   Ht 5' 10\" (1.778 m)   Wt 132.9 kg (293 lb)   SpO2 98%   BMI 42.04 kg/m²   Past Medical History:  No date: Adverse effect of anesthesia      Comment:  HARD TO WAKE UP FROM THE SURGERY   6/23/2010: Anxiety state, unspecified  No date: Asthma  No date: Depression  03/1997: DVT (deep venous thrombosis) ([facility])      Comment:  LT DVT  No date: Left atrial enlargement      Comment:  RIGHT ATR. ENL. No date: Meniere's disease  No date:  Morbid obesity ([address]  No date: Osteoarthritis  No date: Psychiatric disorder      Comment:   PANIC ATTACK   03/1997: Pulmonary embolism ([facility] [address]      Comment:  MULTIPLE BILA   No date: Thyroid disease      Comment:  HYPO  6/23/2010: Unspecified asthma(493.90)  No date: Vitamin D deficiency  Past Surgical History:  No date: ABDOMEN SURGERY PROC UNLISTED  1996: ENDOMETRIAL CRYOABLATION  No date:  GASTRIC BYPASS,OBESE<150CM TONIA-EN-Y  No date: HX AMPUTATION      Comment:  RIGHT FOOT 4 TH TOE  No date: HX APPENDECTOMY  1998: HX BREAST BIOPSY      Comment:   RT BREAST ,  BENIIGN  07/02/2015: HX CERVICAL LAMINECTOMY      Comment:  C5-C6  No date: HX CHOLECYSTECTOMY  05/06/2016: HX COLONOSCOPY      Comment:  Dr. Walton Boxer - 8 yr f/u  2002: HX GASTRIC BYPASS  No date: HX GYN      Comment:  ABLATION   No date: HX GYN  No date: HX KNEE ARTHROSCOPY      Comment:  RIGHT   No date: HX ORTHOPAEDIC      Comment:  REMOVAL COCCYX  04/23/2018: HX SMALL BOWEL RESECTION      Comment:  by Dr Barr Page  No date: HX TONSILLECTOMY  No date: REMOVAL OF COCCYX  Social History    Socioeconomic History      Marital status:       Spouse name: Not on file      Number of children: Not on file      Years of education: Not on file      Highest education level: Not on file    Tobacco Use      Smoking status: Never Smoker      Smokeless tobacco: Never Used    Substance and Sexual Activity      Alcohol use: No        Alcohol/week: 0.0 standard drinks      Drug use: No      Sexual activity: Yes        Partners: Male    Review of patient's family history indicates:  Problem: Diabetes      Relation: Mother          Age of Onset: (Not Specified)  Problem: Stroke      Relation: Mother          Age of Onset: (Not Specified)          Comment: TIA  Problem: Heart Disease      Relation: Mother          Age of Onset: (Not Specified)  Problem: Hypertension      Relation: Mother          Age of Onset: (Not Specified)  Problem: Heart Disease      Relation: Father          Age of Onset: (Not Specified)  Problem: Diabetes      Relation: Father          Age of Onset: (Not Specified)  Problem: Lung Disease      Relation: Father          Age of Onset: (Not Specified)          Comment: COPD  Problem: Kidney Disease      Relation: Father          Age of Onset: (Not Specified)          Comment: STAGE 3  Problem: Anesth Problems      Relation: Neg Hx Age of Onset: (Not Specified)    Current Outpatient Medications:  SYMBICORT 80-4.5 mcg/actuation HFAA,   omeprazole (PRILOSEC) 40 mg capsule,   traMADol (ULTRAM) 50 mg tablet, TAKE 1 TABLET BY MOUTH EVERY 8 HOURS AS NEEDED  levothyroxine (SYNTHROID) 125 mcg tablet, TAKE ONE TABLET BY MOUTH ONE TIME DAILY BEFORE BREAKFAST  escitalopram oxalate (LEXAPRO) 20 mg tablet, TAKE 1 TAB BY MOUTH DAILY. INDICATIONS: ANXIOUSNESS ASSOCIATED WITH DEPRESSION  furosemide (LASIX) 40 mg tablet, TAKE 1 TABLET BY MOUTH EVERY DAY  albuterol (PROAIR HFA) 90 mcg/actuation inhaler, Take 2 Puffs by inhalation every six (6) hours as needed for Wheezing. Indications: Asthma Attack  acetaminophen (TYLENOL EXTRA STRENGTH) 500 mg tablet, Take 1,000 mg by mouth every six (6) hours as needed for Pain. Indications: Pain  VIT C/E/ZN/COPPR/LUTEIN/ZEAXAN (PRESERVISION AREDS 2 PO), Take  by mouth two (2) times a day. ALPRAZolam (XANAX) 0.5 mg tablet, Take 1 Tab by mouth three (3) times daily as needed for Sleep or Anxiety. Indications: ANXIETY  aspirin delayed-release 81 mg tablet, Take  by mouth daily. leucovorin calcium (WELLCOVORIN) 5 mg tablet, Take  by mouth every seven (7) days. fexofenadine (ALLEGRA) 180 mg tablet, Take 180 mg by mouth nightly. methotrexate (RHEUMATREX) 2.5 mg tablet, Take 8 Tabs by mouth every Sunday. infliximab (REMICADE) 100 mg injection, 5 mg/kg by IntraVENous route once. [id number]  folic acid (FOLVITE) 1 mg tablet, Take 4 mg by mouth daily. No current facility-administered medications for this visit. Allergies: No Known Allergies  _____________________________________________________________________________            Abdominal Pain   The history is provided by the patient. This is a chronic problem. The current episode started more than 1 week ago. The problem occurs constantly. The problem has not changed since onset. Associated symptoms include abdominal pain and shortness of breath.  Pertinent negatives include no chest pain and no headaches. Nothing aggravates the symptoms. Nothing relieves the symptoms. The treatment provided no relief. Shortness of Breath   Associated symptoms include abdominal pain. Pertinent negatives include no fever, no headaches, no ear pain, no chest pain and no rash. Review of Systems   Constitutional: Negative for chills, fever and weight loss. HENT: Negative for ear pain. Eyes: Negative for pain. Respiratory: Positive for shortness of breath. Cardiovascular: Negative for chest pain. Gastrointestinal: Positive for abdominal pain. Negative for blood in stool. Genitourinary: Negative for hematuria. Musculoskeletal: Negative for joint pain. Skin: Negative for rash. Neurological: Negative for dizziness, focal weakness, seizures and headaches. Endo/Heme/Allergies: Does not bruise/bleed easily. Psychiatric/Behavioral: The patient does not have insomnia. Physical Exam  Constitutional:       General: She is not in acute distress. Appearance: She is well-developed. She is not diaphoretic. HENT:      Head: Normocephalic and atraumatic. Mouth/Throat:      Pharynx: No oropharyngeal exudate. Eyes:      Pupils: Pupils are equal, round, and reactive to light. Neck:      Musculoskeletal: Normal range of motion. Trachea: No tracheal deviation. Cardiovascular:      Rate and Rhythm: Normal rate and regular rhythm. Heart sounds: Normal heart sounds. No murmur. Pulmonary:      Effort: Pulmonary effort is normal. No respiratory distress. Breath sounds: Normal breath sounds. No wheezing. Abdominal:      General: Bowel sounds are normal. There is no distension. Palpations: Abdomen is soft. There is no mass. Tenderness: There is no abdominal tenderness. There is no guarding or rebound. Musculoskeletal: Normal range of motion. General: No tenderness. Lymphadenopathy:      Cervical: No cervical adenopathy. Skin:     General: Skin is warm. Findings: No erythema or rash. Neurological:      Mental Status: She is alert and oriented to person, place, and time. Psychiatric:         Behavior: Behavior normal.         ASSESSMENT and PLAN    ICD-10-CM ICD-9-CM    1. Constipation, unspecified constipation type K59.00 564.00 XR BA ENEMA  AIR CONT   2. RUQ pain R10.11 789.01 XR BA ENEMA  AIR CONT      Primary complaint is dyspnea on exertion    She is already had an extensive work-up I reassured her she does not have a hernia. I am not sure anything I find in the belly would explain her shortness of breath and palpitations. Because the area of her concern and exam, I will order a barium enema. Further management after that study. Expressed understanding of our discussion. Agreeable with the plan.

## 2020-01-30 NOTE — PROGRESS NOTES
Chief Complaint   Patient presents with    Abdominal Pain     Patient seen today to be evaluated for bulge in abdomen and SOB, referred by a friend.  Shortness of Breath     1. Have you been to the ER, urgent care clinic since your last visit? Hospitalized since your last visit? No    2. Have you seen or consulted any other health care providers outside of the [address] [place] [address] since your last visit? Include any pap smears or colon screening.  Yes Dr. Condon Abo

## 2020-02-20 ENCOUNTER — TELEPHONE (OUTPATIENT)
Dept: SURGERY | Age: 65
End: 2020-02-20

## 2020-02-20 ENCOUNTER — HOSPITAL ENCOUNTER (EMERGENCY)
Age: 65
Discharge: HOME | End: 2020-02-20
Attending: EMERGENCY MEDICINE
Payer: COMMERCIAL

## 2020-02-20 VITALS
SYSTOLIC BLOOD PRESSURE: 119 MMHG | TEMPERATURE: 98 F | BODY MASS INDEX: 43.4 KG/M2 | HEART RATE: 60 BPM | HEIGHT: 69 IN | OXYGEN SATURATION: 99 % | RESPIRATION RATE: 18 BRPM | DIASTOLIC BLOOD PRESSURE: 60 MMHG | WEIGHT: 293 LBS

## 2020-02-20 DIAGNOSIS — R42 DIZZINESS: ICD-10-CM

## 2020-02-20 DIAGNOSIS — T78.40XA ALLERGIC REACTION, INITIAL ENCOUNTER: Primary | ICD-10-CM

## 2020-02-20 DIAGNOSIS — R06.02 SOB (SHORTNESS OF BREATH): ICD-10-CM

## 2020-02-20 LAB
ANION GAP SERPL CALC-SCNC: 4 MMOL/L (ref 5–15)
APPEARANCE UR: CLEAR
BACTERIA URNS QL MICRO: NEGATIVE /HPF
BASOPHILS # BLD: 0.1 K/UL (ref 0–0.1)
BASOPHILS NFR BLD: 2 % (ref 0–1)
BILIRUB UR QL: NEGATIVE
BUN SERPL-MCNC: 17 MG/DL (ref 6–20)
BUN/CREAT SERPL: 20 (ref 12–20)
CALCIUM SERPL-MCNC: 8.4 MG/DL (ref 8.5–10.1)
CHLORIDE SERPL-SCNC: 108 MMOL/L (ref 97–108)
CO2 SERPL-SCNC: 28 MMOL/L (ref 21–32)
COLOR UR: ABNORMAL
CREAT SERPL-MCNC: 0.87 MG/DL (ref 0.55–1.02)
DIFFERENTIAL METHOD BLD: ABNORMAL
EOSINOPHIL # BLD: 0.4 K/UL (ref 0–0.4)
EOSINOPHIL NFR BLD: 5 % (ref 0–7)
EPITH CASTS URNS QL MICRO: ABNORMAL /LPF
ERYTHROCYTE [DISTWIDTH] IN BLOOD BY AUTOMATED COUNT: 19.6 % (ref 11.5–14.5)
GLUCOSE SERPL-MCNC: 99 MG/DL (ref 65–100)
GLUCOSE UR STRIP.AUTO-MCNC: NEGATIVE MG/DL
HCT VFR BLD AUTO: 34.7 % (ref 35–47)
HGB BLD-MCNC: 10.4 G/DL (ref 11.5–16)
HGB UR QL STRIP: NEGATIVE
IMM GRANULOCYTES # BLD AUTO: 0 K/UL (ref 0–0.04)
IMM GRANULOCYTES NFR BLD AUTO: 0 % (ref 0–0.5)
KETONES UR QL STRIP.AUTO: NEGATIVE MG/DL
LEUKOCYTE ESTERASE UR QL STRIP.AUTO: ABNORMAL
LYMPHOCYTES # BLD: 1.5 K/UL (ref 0.8–3.5)
LYMPHOCYTES NFR BLD: 20 % (ref 12–49)
MCH RBC QN AUTO: 25.6 PG (ref 26–34)
MCHC RBC AUTO-ENTMCNC: 30 G/DL (ref 30–36.5)
MCV RBC AUTO: 85.5 FL (ref 80–99)
MONOCYTES # BLD: 0.5 K/UL (ref 0–1)
MONOCYTES NFR BLD: 6 % (ref 5–13)
NEUTS SEG # BLD: 5 K/UL (ref 1.8–8)
NEUTS SEG NFR BLD: 67 % (ref 32–75)
NITRITE UR QL STRIP.AUTO: NEGATIVE
NRBC # BLD: 0 K/UL (ref 0–0.01)
NRBC BLD-RTO: 0 PER 100 WBC
PH UR STRIP: 6.5 [PH] (ref 5–8)
PLATELET # BLD AUTO: 259 K/UL (ref 150–400)
PMV BLD AUTO: 12.7 FL (ref 8.9–12.9)
POTASSIUM SERPL-SCNC: 4.1 MMOL/L (ref 3.5–5.1)
PROT UR STRIP-MCNC: NEGATIVE MG/DL
RBC # BLD AUTO: 4.06 M/UL (ref 3.8–5.2)
RBC #/AREA URNS HPF: ABNORMAL /HPF
SODIUM SERPL-SCNC: 140 MMOL/L (ref 136–145)
SP GR UR REFRACTOMETRY: 1.02 (ref 1–1.03)
UROBILINOGEN UR QL STRIP.AUTO: 1 EU/DL (ref 0.2–1)
WBC # BLD AUTO: 7.5 K/UL (ref 3.6–11)
WBC URNS QL MICRO: ABNORMAL /HPF

## 2020-02-20 PROCEDURE — 93005 ELECTROCARDIOGRAM TRACING: CPT

## 2020-02-20 PROCEDURE — 85025 COMPLETE CBC W/AUTO DIFF WBC: CPT

## 2020-02-20 PROCEDURE — 80048 BASIC METABOLIC PNL TOTAL CA: CPT

## 2020-02-20 PROCEDURE — 81001 URINALYSIS AUTO W/SCOPE: CPT

## 2020-02-20 PROCEDURE — 99284 EMERGENCY DEPT VISIT MOD MDM: CPT

## 2020-02-20 PROCEDURE — 36415 COLL VENOUS BLD VENIPUNCTURE: CPT

## 2020-02-20 RX ORDER — TRAMADOL HYDROCHLORIDE 50 MG/1
50 TABLET ORAL
COMMUNITY
End: 2020-03-27 | Stop reason: SDUPTHER

## 2020-02-20 NOTE — TELEPHONE ENCOUNTER
Pt c/o excessive sweating,rapid HR abd dizziness after drinking 1 bottle of citrate of magnesium for prep prior to Barium enema scheduled for tomorrow. She is very concerned about reaction to CM, face red flush. She will have her  to bring her to er.

## 2020-02-21 ENCOUNTER — TELEPHONE (OUTPATIENT)
Dept: SURGERY | Age: 65
End: 2020-02-21

## 2020-02-21 ENCOUNTER — HOSPITAL ENCOUNTER (OUTPATIENT)
Dept: GENERAL RADIOLOGY | Age: 65
Discharge: HOME | End: 2020-02-21
Attending: SURGERY
Payer: COMMERCIAL

## 2020-02-21 DIAGNOSIS — K59.00 CONSTIPATION, UNSPECIFIED CONSTIPATION TYPE: ICD-10-CM

## 2020-02-21 DIAGNOSIS — R10.11 RUQ PAIN: ICD-10-CM

## 2020-02-21 LAB
ATRIAL RATE: 59 BPM
CALCULATED P AXIS, ECG09: 57 DEGREES
CALCULATED R AXIS, ECG10: 39 DEGREES
CALCULATED T AXIS, ECG11: 33 DEGREES
DIAGNOSIS, 93000: NORMAL
P-R INTERVAL, ECG05: 170 MS
Q-T INTERVAL, ECG07: 448 MS
QRS DURATION, ECG06: 80 MS
QTC CALCULATION (BEZET), ECG08: 443 MS
VENTRICULAR RATE, ECG03: 59 BPM

## 2020-02-21 PROCEDURE — 74270 X-RAY XM COLON 1CNTRST STD: CPT

## 2020-02-21 NOTE — ED PROVIDER NOTES
EMERGENCY DEPARTMENT HISTORY AND PHYSICAL EXAM      Date: 2/20/2020  Patient Name: Hermilo Ledesma    History of Presenting Illness     Chief Complaint   Patient presents with    Dizziness     pt finished drinking mag citrate when she began to feel flushed, sob, and dizziness lasting about 5 min. called dr. Daniel Mcghee and was instructed to come to ED. symptoms resolved. History Provided By: Patient    HPI: Hermilo Ledesma, 72 y.o. female presents to the ED with cc of dizziness and shortness of breath. The patient was drinking magnesium citrate when she started to experience the symptoms. She took one sip and the left side of her face became flushed. She drank some more and started to feel lightheaded and short of breath. She did not have chest pain, but the duration of her symptoms was approximately 15 minutes. She called her surgeon's nurse and they instructed her to come to the ER for evaluation. Her symptoms have resolved at that time. She denies any itching, throat tightness, edema or cough. There are no other complaints, changes, or physical findings at this time. PCP: Edison Fuller MD    No current facility-administered medications on file prior to encounter. Current Outpatient Medications on File Prior to Encounter   Medication Sig Dispense Refill    traMADol (ULTRAM) 50 mg tablet Take 50 mg by mouth every six (6) hours as needed for Pain.  SYMBICORT 80-4.5 mcg/actuation HFAA       omeprazole (PRILOSEC) 40 mg capsule       levothyroxine (SYNTHROID) 125 mcg tablet TAKE ONE TABLET BY MOUTH ONE TIME DAILY BEFORE BREAKFAST 90 Tab 0    escitalopram oxalate (LEXAPRO) 20 mg tablet TAKE 1 TAB BY MOUTH DAILY. INDICATIONS: ANXIOUSNESS ASSOCIATED WITH DEPRESSION 90 Tab 1    furosemide (LASIX) 40 mg tablet TAKE 1 TABLET BY MOUTH EVERY DAY 90 Tab 1    albuterol (PROAIR HFA) 90 mcg/actuation inhaler Take 2 Puffs by inhalation every six (6) hours as needed for Wheezing. Indications: Asthma Attack 1 Inhaler 11    acetaminophen (TYLENOL EXTRA STRENGTH) 500 mg tablet Take 1,000 mg by mouth every six (6) hours as needed for Pain. Indications: Pain      VIT C/E/ZN/COPPR/LUTEIN/ZEAXAN (PRESERVISION AREDS 2 PO) Take  by mouth two (2) times a day.  ALPRAZolam (XANAX) 0.5 mg tablet Take 1 Tab by mouth three (3) times daily as needed for Sleep or Anxiety. Indications: ANXIETY 30 Tab 1    aspirin delayed-release 81 mg tablet Take  by mouth daily.  leucovorin calcium (WELLCOVORIN) 5 mg tablet Take  by mouth every seven (7) days.  fexofenadine (ALLEGRA) 180 mg tablet Take 180 mg by mouth nightly.  methotrexate (RHEUMATREX) 2.5 mg tablet Take 8 Tabs by mouth every Sunday.  infliximab (REMICADE) 100 mg injection 5 mg/kg by IntraVENous route once. [id number]      folic acid (FOLVITE) 1 mg tablet Take 4 mg by mouth daily. Past History     Past Medical History:  Past Medical History:   Diagnosis Date    Adverse effect of anesthesia     HARD TO WAKE UP FROM THE SURGERY     Anxiety state, unspecified 6/23/2010    Asthma     Depression     DVT (deep venous thrombosis) ([facility] [address] 03/1997    LT DVT    Left atrial enlargement     RIGHT ATR. ENL.     Meniere's disease     Morbid obesity ([facility] [address]     Osteoarthritis     Psychiatric disorder      PANIC ATTACK     Pulmonary embolism ([facility] [address] 03/1997    MULTIPLE BILA     Thyroid disease     HYPO    Unspecified asthma(493.90) 6/23/2010    Vitamin D deficiency        Past Surgical History:  Past Surgical History:   Procedure Laterality Date    ABDOMEN SURGERY PROC UNLISTED      ENDOMETRIAL CRYOABLATION  1996    GASTRIC BYPASS,OBESE<150CM TONIA-EN-Y      HX AMPUTATION      RIGHT FOOT 4 TH TOE    HX APPENDECTOMY      HX BREAST BIOPSY  1998     RT BREAST ,  BENIIGN    HX CERVICAL LAMINECTOMY  07/02/2015    C5-C6    HX CHOLECYSTECTOMY      HX COLONOSCOPY  05/06/2016    Dr. Corbin Jimenez - 8 yr f/u    HX GASTRIC BYPASS  2002    HX GYN      ABLATION     HX GYN      HX KNEE ARTHROSCOPY      RIGHT     HX ORTHOPAEDIC      REMOVAL COCCYX    HX SMALL BOWEL RESECTION  04/23/2018    by Dr En Amezcua OF COCCYX         Family History:  Family History   Problem Relation Age of Onset    Diabetes Mother     Stroke Mother         TIA    Heart Disease Mother     Hypertension Mother     Heart Disease Father     Diabetes Father     Lung Disease Father         COPD    Kidney Disease Father         STAGE 3    Anesth Problems Neg Hx        Social History:  Social History     Tobacco Use    Smoking status: Never Smoker    Smokeless tobacco: Never Used   Substance Use Topics    Alcohol use: No     Alcohol/week: 0.0 standard drinks    Drug use: No       Allergies: Allergies   Allergen Reactions    Magnesium Citrate Shortness of Breath         Review of Systems   Review of Systems   Constitutional: Negative for chills and fever. HENT: Negative for congestion. Eyes: Negative. Respiratory: Positive for shortness of breath. Cardiovascular: Negative for chest pain. Gastrointestinal: Negative for abdominal pain. Endocrine: Negative for heat intolerance. Genitourinary: Negative for dysuria. Musculoskeletal: Negative for back pain. Skin: Negative for rash. Allergic/Immunologic: Negative for immunocompromised state. Neurological: Positive for dizziness. Hematological: Negative for adenopathy. Does not bruise/bleed easily. Psychiatric/Behavioral: Negative. All other systems reviewed and are negative. Physical Exam   Physical Exam  Vitals signs and nursing note reviewed. Constitutional:       General: She is not in acute distress. Appearance: She is well-developed. HENT:      Head: Normocephalic. Neck:      Musculoskeletal: Normal range of motion and neck supple. Cardiovascular:      Rate and Rhythm: Normal rate and regular rhythm. Heart sounds: Normal heart sounds. Pulmonary:      Effort: Pulmonary effort is normal.      Breath sounds: Normal breath sounds. Abdominal:      General: Bowel sounds are normal.      Palpations: Abdomen is soft. Tenderness: There is no abdominal tenderness. Musculoskeletal: Normal range of motion. Skin:     General: Skin is warm and dry. Neurological:      General: No focal deficit present. Mental Status: She is alert and oriented to person, place, and time. Psychiatric:         Mood and Affect: Mood normal.         Behavior: Behavior normal.         Diagnostic Study Results     Labs -     Recent Results (from the past 12 hour(s))   EKG, 12 LEAD, INITIAL    Collection Time: 02/20/20  3:49 PM   Result Value Ref Range    Ventricular Rate 59 BPM    Atrial Rate 59 BPM    P-R Interval 170 ms    QRS Duration 80 ms    Q-T Interval 448 ms    QTC Calculation (Bezet) 443 ms    Calculated P Axis 57 degrees    Calculated R Axis 39 degrees    Calculated T Axis 33 degrees    Diagnosis       Sinus bradycardia  When compared with ECG of 20-APR-2018 09:01,  Nonspecific T wave abnormality no longer evident in Anterior leads     CBC WITH AUTOMATED DIFF    Collection Time: 02/20/20  5:00 PM   Result Value Ref Range    WBC 7.5 3.6 - 11.0 K/uL    RBC 4.06 3.80 - 5.20 M/uL    HGB 10.4 (L) 11.5 - 16.0 g/dL    HCT 34.7 (L) 35.0 - 47.0 %    MCV 85.5 80.0 - 99.0 FL    MCH 25.6 (L) 26.0 - 34.0 PG    MCHC 30.0 30.0 - 36.5 g/dL    RDW 19.6 (H) 11.5 - 14.5 %    PLATELET 198 338 - 370 K/uL    MPV 12.7 8.9 - 12.9 FL    NRBC 0.0 0  WBC    ABSOLUTE NRBC 0.00 0.00 - 0.01 K/uL    NEUTROPHILS 67 32 - 75 %    LYMPHOCYTES 20 12 - 49 %    MONOCYTES 6 5 - 13 %    EOSINOPHILS 5 0 - 7 %    BASOPHILS 2 (H) 0 - 1 %    IMMATURE GRANULOCYTES 0 0.0 - 0.5 %    ABS. NEUTROPHILS 5.0 1.8 - 8.0 K/UL    ABS. LYMPHOCYTES 1.5 0.8 - 3.5 K/UL    ABS. MONOCYTES 0.5 0.0 - 1.0 K/UL    ABS. EOSINOPHILS 0.4 0.0 - 0.4 K/UL    ABS. BASOPHILS 0.1 0.0 - 0.1 K/UL    ABS. IMM.  GRANS. 0.0 0.00 - 0.04 K/UL    DF AUTOMATED     METABOLIC PANEL, BASIC    Collection Time: 02/20/20  5:51 PM   Result Value Ref Range    Sodium 140 136 - 145 mmol/L    Potassium 4.1 3.5 - 5.1 mmol/L    Chloride 108 97 - 108 mmol/L    CO2 28 21 - 32 mmol/L    Anion gap 4 (L) 5 - 15 mmol/L    Glucose 99 65 - 100 mg/dL    BUN 17 6 - 20 MG/DL    Creatinine 0.87 0.55 - 1.02 MG/DL    BUN/Creatinine ratio 20 12 - 20      GFR est AA >60 >60 ml/min/1.73m2    GFR est non-AA >60 >60 ml/min/1.73m2    Calcium 8.4 (L) 8.5 - 10.1 MG/DL   URINALYSIS W/ RFLX MICROSCOPIC    Collection Time: 02/20/20  6:20 PM   Result Value Ref Range    Color YELLOW/STRAW      Appearance CLEAR CLEAR      Specific gravity 1.025 1.003 - 1.030      pH (UA) 6.5 5.0 - 8.0      Protein NEGATIVE  NEG mg/dL    Glucose NEGATIVE  NEG mg/dL    Ketone NEGATIVE  NEG mg/dL    Bilirubin NEGATIVE  NEG      Blood NEGATIVE  NEG      Urobilinogen 1.0 0.2 - 1.0 EU/dL    Nitrites NEGATIVE  NEG      Leukocyte Esterase SMALL (A) NEG      WBC 0-4 /hpf    RBC 0-5 /hpf    Epithelial cells MANY /lpf    Bacteria NEGATIVE  /hpf       Radiologic Studies -   No orders to display     CT Results  (Last 48 hours)    None        CXR Results  (Last 48 hours)    None          Medical Decision Making   I am the first provider for this patient. I reviewed the vital signs, available nursing notes, past medical history, past surgical history, family history and social history. Vital Signs-Reviewed the patient's vital signs. Patient Vitals for the past 12 hrs:   Temp Pulse Resp BP SpO2   02/20/20 1859  75 18 129/68 99 %   02/20/20 1857  74 18 122/72 98 %   02/20/20 1856  63 18 102/61 97 %   02/20/20 1540 98 °F (36.7 °C) 62 18 130/68 97 %       EKG interpretation: (Preliminary)  Rhythm: sinus bradycardia; and regular . Rate (approx.): 59; Axis: normal; MO interval: normal; QRS interval: normal ; ST/T wave: normal; Other findings: unchanged from previous ekg.     Records Reviewed: Nursing Notes, Old Medical Records, Previous electrocardiograms, Previous Radiology Studies and Previous Laboratory Studies    Provider Notes (Medical Decision Making): Allergic reaction, electrolyte abnormality, dehydration, arrhythmia    ED Course:   Initial assessment performed. The patients presenting problems have been discussed, and they are in agreement with the care plan formulated and outlined with them. I have encouraged them to ask questions as they arise throughout their visit. Progress note:    Patient's results have been reviewed. The patient is feeling better. She is advised to follow-up and return to ER if worse         Critical Care Time:   0    Disposition:  home    PLAN:  1. Current Discharge Medication List        2. Follow-up Information     Follow up With Specialties Details Why Contact Info    Ching Dos Santos MD Internal Medicine In 4 days As needed [address]   [address] [facility]  [phone]      Avoid magnesium citrate        [facility] EMERGENCY DEPT Emergency Medicine  If symptoms worsen [address]  [address] [facility]  [phone]        Return to ED if worse     Diagnosis     Clinical Impression:   1. Allergic reaction, initial encounter    2. Dizziness    3. SOB (shortness of breath)        Attestations:    Tracie Ordonez MD    Please note that this dictation was completed with [employer], the [employer] voice recognition software. Quite often unanticipated grammatical, syntax, homophones, and other interpretive errors are inadvertently transcribed by the computer software. Please disregard these errors. Please excuse any errors that have escaped final proofreading. Thank you.

## 2020-02-21 NOTE — TELEPHONE ENCOUNTER
Informed pt BE looks fine. There are no strictures or obstructions per Provider. He do not believe anything in her belly is causing her sob. No surgical issues. Expressed understanding.

## 2020-02-21 NOTE — DISCHARGE INSTRUCTIONS
Patient Education        Allergic Reaction: Care Instructions  Your Care Instructions    An allergic reaction is an excessive response from your immune system to a medicine, chemical, food, insect bite, or other substance. A reaction can range from mild to life-threatening. Some people have a mild rash, hives, and itching or stomach cramps. In severe reactions, swelling of your tongue and throat can close up your airway so that you cannot breathe. Follow-up care is a key part of your treatment and safety. Be sure to make and go to all appointments, and call your doctor if you are having problems. It's also a good idea to know your test results and keep a list of the medicines you take. How can you care for yourself at home? · If you know what caused your allergic reaction, be sure to avoid it. Your allergy may become more severe each time you have a reaction. · Take an over-the-counter antihistamine, such as cetirizine (Zyrtec) or loratadine (Claritin), to treat mild symptoms. Read and follow directions on the label. Some antihistamines can make you feel sleepy. Do not give antihistamines to a child unless you have checked with your doctor first. Mild symptoms include sneezing or an itchy or runny nose; an itchy mouth; a few hives or mild itching; and mild nausea or stomach discomfort. · Do not scratch hives or a rash. Put a cold, moist towel on them or take cool baths to relieve itching. Put ice packs on hives, swelling, or insect stings for 10 to 15 minutes at a time. Put a thin cloth between the ice pack and your skin. Do not take hot baths or showers. They will make the itching worse. · Your doctor may prescribe a shot of epinephrine to carry with you in case you have a severe reaction. Learn how to give yourself the shot and keep it with you at all times. Make sure it is not .   · Go to the emergency room every time you have a severe reaction, even if you have used your shot of epinephrine and are feeling better. Symptoms can come back after a shot. · Wear medical alert jewelry that lists your allergies. You can buy this at most drugstores. · If your child has a severe allergy, make sure that his or her teachers, babysitters, coaches, and other caregivers know about the allergy. They should have an epinephrine shot, know how and when to give it, and have a plan to take your child to the hospital.  When should you call for help? Give an epinephrine shot if:    · You think you are having a severe allergic reaction.     · You have symptoms in more than one body area, such as mild nausea and an itchy mouth.    After giving an epinephrine shot call 911, even if you feel better.   Call 911 if:    · You have symptoms of a severe allergic reaction. These may include:  ? Sudden raised, red areas (hives) all over your body. ? Swelling of the throat, mouth, lips, or tongue. ? Trouble breathing. ? Passing out (losing consciousness). Or you may feel very lightheaded or suddenly feel weak, confused, or restless.     · You have been given an epinephrine shot, even if you feel better.    Call your doctor now or seek immediate medical care if:    · You have symptoms of an allergic reaction, such as:  ? A rash or hives (raised, red areas on the skin). ? Itching. ? Swelling. ? Belly pain, nausea, or vomiting.    Watch closely for changes in your health, and be sure to contact your doctor if:    · You do not get better as expected. Where can you learn more? Go to http://theresa-sandra.info/. Enter D446 in the search box to learn more about \"Allergic Reaction: Care Instructions. \"  Current as of: April 7, 2019  Content Version: 12.2  © 5459-0145 [employer]. Care instructions adapted under license by [employer] (which disclaims liability or warranty for this information).  If you have questions about a medical condition or this instruction, always ask your healthcare professional. [address] any warranty or liability for your use of this information. Patient Education        Dizziness: Care Instructions  Your Care Instructions  Dizziness is the feeling of unsteadiness or fuzziness in your head. It is different than having vertigo, which is a feeling that the room is spinning or that you are moving or falling. It is also different from lightheadedness, which is the feeling that you are about to faint. It can be hard to know what causes dizziness. Some people feel dizzy when they have migraine headaches. Sometimes bouts of flu can make you feel dizzy. Some medical conditions, such as heart problems or high blood pressure, can make you feel dizzy. Many medicines can cause dizziness, including medicines for high blood pressure, pain, or anxiety. If a medicine causes your symptoms, your doctor may recommend that you stop or change the medicine. If it is a problem with your heart, you may need medicine to help your heart work better. If there is no clear reason for your symptoms, your doctor may suggest watching and waiting for a while to see if the dizziness goes away on its own. Follow-up care is a key part of your treatment and safety. Be sure to make and go to all appointments, and call your doctor if you are having problems. It's also a good idea to know your test results and keep a list of the medicines you take. How can you care for yourself at home? · If your doctor recommends or prescribes medicine, take it exactly as directed. Call your doctor if you think you are having a problem with your medicine. · Do not drive while you feel dizzy. · Try to prevent falls. Steps you can take include:  ? Using nonskid mats, adding grab bars near the tub, and using night-lights. ? Clearing your home so that walkways are free of anything you might trip on.  ? Letting family and friends know that you have been feeling dizzy.  This will help them know how to help you. When should you call for help? Call 911 anytime you think you may need emergency care. For example, call if:    · You passed out (lost consciousness).     · You have dizziness along with symptoms of a heart attack. These may include:  ? Chest pain or pressure, or a strange feeling in the chest.  ? Sweating. ? Shortness of breath. ? Nausea or vomiting. ? Pain, pressure, or a strange feeling in the back, neck, jaw, or upper belly or in one or both shoulders or arms. ? Lightheadedness or sudden weakness. ? A fast or irregular heartbeat.     · You have symptoms of a stroke. These may include:  ? Sudden numbness, tingling, weakness, or loss of movement in your face, arm, or leg, especially on only one side of your body. ? Sudden vision changes. ? Sudden trouble speaking. ? Sudden confusion or trouble understanding simple statements. ? Sudden problems with walking or balance. ? A sudden, severe headache that is different from past headaches.    Call your doctor now or seek immediate medical care if:    · You feel dizzy and have a fever, headache, or ringing in your ears.     · You have new or increased nausea and vomiting.     · Your dizziness does not go away or comes back.    Watch closely for changes in your health, and be sure to contact your doctor if:    · You do not get better as expected. Where can you learn more? Go to http://theresa-sandra.info/. Enter Y072 in the search box to learn more about \"Dizziness: Care Instructions. \"  Current as of: June 26, 2019  Content Version: 12.2  © 7690-6997 [employer]. Care instructions adapted under license by [employer] (which disclaims liability or warranty for this information). If you have questions about a medical condition or this instruction, always ask your healthcare professional. [address] any warranty or liability for your use of this information.          Patient Education        Shortness of Breath: Care Instructions  Your Care Instructions  Shortness of breath has many causes. Sometimes conditions such as anxiety can lead to shortness of breath. Some people get mild shortness of breath when they exercise. Trouble breathing also can be a symptom of a serious problem, such as asthma, lung disease, emphysema, heart problems, and pneumonia. If your shortness of breath continues, you may need tests and treatment. Watch for any changes in your breathing and other symptoms. Follow-up care is a key part of your treatment and safety. Be sure to make and go to all appointments, and call your doctor if you are having problems. It's also a good idea to know your test results and keep a list of the medicines you take. How can you care for yourself at home? · Do not smoke or allow others to smoke around you. If you need help quitting, talk to your doctor about stop-smoking programs and medicines. These can increase your chances of quitting for good. · Get plenty of rest and sleep. · Take your medicines exactly as prescribed. Call your doctor if you think you are having a problem with your medicine. · Find healthy ways to deal with stress. ? Exercise daily. ? Get plenty of sleep. ? Eat regularly and well. When should you call for help? Call 911 anytime you think you may need emergency care. For example, call if:    · You have severe shortness of breath.     · You have symptoms of a heart attack. These may include:  ? Chest pain or pressure, or a strange feeling in the chest.  ? Sweating. ? Shortness of breath. ? Nausea or vomiting. ? Pain, pressure, or a strange feeling in the back, neck, jaw, or upper belly or in one or both shoulders or arms. ? Lightheadedness or sudden weakness. ? A fast or irregular heartbeat. After you call 911, the  may tell you to chew 1 adult-strength or 2 to 4 low-dose aspirin. Wait for an ambulance. Do not try to drive yourself.  Call your doctor now or seek immediate medical care if:    · Your shortness of breath gets worse or you start to wheeze. Wheezing is a high-pitched sound when you breathe.     · You wake up at night out of breath or have to prop your head up on several pillows to breathe.     · You are short of breath after only light activity or while at rest.    Watch closely for changes in your health, and be sure to contact your doctor if:    · You do not get better over the next 1 to 2 days. Where can you learn more? Go to http://theresa-sandra.info/. Enter S780 in the search box to learn more about \"Shortness of Breath: Care Instructions. \"  Current as of: June 9, 2019  Content Version: 12.2  © 3346-1935 [employer]. Care instructions adapted under license by [employer] (which disclaims liability or warranty for this information). If you have questions about a medical condition or this instruction, always ask your healthcare professional. [address] any warranty or liability for your use of this information.

## 2020-02-21 NOTE — TELEPHONE ENCOUNTER
Let her know the barium enema looks fine there is no strictures or obstruction. I do not believe anything in her belly is causing her shortness of breath. Does not have any surgical issues.

## 2020-03-27 DIAGNOSIS — M06.9 RHEUMATOID ARTHRITIS, INVOLVING UNSPECIFIED SITE, UNSPECIFIED RHEUMATOID FACTOR PRESENCE: Primary | ICD-10-CM

## 2020-03-29 RX ORDER — TRAMADOL HYDROCHLORIDE 50 MG/1
50 TABLET ORAL
Qty: 60 TAB | Refills: 0 | Status: SHIPPED | OUTPATIENT
Start: 2020-03-29 | End: 2020-05-28

## 2020-03-31 RX ORDER — FUROSEMIDE 40 MG/1
TABLET ORAL
Qty: 90 TAB | Refills: 1 | Status: SHIPPED | OUTPATIENT
Start: 2020-03-31 | End: 2020-09-27

## 2020-04-13 RX ORDER — LEVOTHYROXINE SODIUM 125 UG/1
TABLET ORAL
Qty: 90 TAB | Refills: 0 | Status: SHIPPED | OUTPATIENT
Start: 2020-04-13 | End: 2020-07-08

## 2020-05-28 DIAGNOSIS — M06.9 RHEUMATOID ARTHRITIS, INVOLVING UNSPECIFIED SITE, UNSPECIFIED RHEUMATOID FACTOR PRESENCE: ICD-10-CM

## 2020-05-28 RX ORDER — TRAMADOL HYDROCHLORIDE 50 MG/1
TABLET ORAL
Qty: 60 TAB | Refills: 0 | Status: SHIPPED | OUTPATIENT
Start: 2020-05-28 | End: 2020-06-27

## 2020-06-24 RX ORDER — ESCITALOPRAM OXALATE 20 MG/1
20 TABLET ORAL DAILY
Qty: 90 TAB | Refills: 1 | Status: SHIPPED | OUTPATIENT
Start: 2020-06-24 | End: 2020-10-07

## 2020-07-08 RX ORDER — LEVOTHYROXINE SODIUM 125 UG/1
TABLET ORAL
Qty: 90 TAB | Refills: 0 | Status: SHIPPED | OUTPATIENT
Start: 2020-07-08 | End: 2020-10-19

## 2020-09-27 RX ORDER — FUROSEMIDE 40 MG/1
TABLET ORAL
Qty: 90 TAB | Refills: 0 | Status: SHIPPED | OUTPATIENT
Start: 2020-09-27

## 2020-10-07 RX ORDER — ESCITALOPRAM OXALATE 20 MG/1
TABLET ORAL
Qty: 90 TAB | Refills: 0 | Status: SHIPPED | OUTPATIENT
Start: 2020-10-07

## 2020-10-19 RX ORDER — LEVOTHYROXINE SODIUM 125 UG/1
TABLET ORAL
Qty: 90 TAB | Refills: 0 | Status: SHIPPED | OUTPATIENT
Start: 2020-10-19

## 2021-08-03 PROBLEM — J45.909 ASTHMA: Status: RESOLVED | Noted: 2021-08-03 | Resolved: 2021-08-03

## 2021-12-27 RX ORDER — ESCITALOPRAM OXALATE 20 MG/1
TABLET ORAL
Qty: 90 TABLET | Refills: 0 | OUTPATIENT
Start: 2021-12-27

## 2022-10-27 NOTE — PROGRESS NOTES
Progress Note    Patient: Davida Ko MRN: [id number]  SSN: [id number]    YOB: 1955  Age: 61 y.o. Sex: female      Admit Date: 2018    1 Day Post-Op    Procedure:  Procedure(s):  LAPAROSCOPIC SMALL BOWEL RESECTION WITH JJ RECONSTRUCTION    Subjective:     Patient has good pain and nausea control; ambulating, performing spirometry. Objective:     Visit Vitals    /61    Pulse 66    Temp 98.5 °F (36.9 °C)    Resp 16    Ht 5' 10\" (1.778 m)    Wt 285 lb 6 oz (129.4 kg)    SpO2 94%    BMI 40.95 kg/m2       Temp (24hrs), Av °F (36.7 °C), Min:97.5 °F (36.4 °C), Max:98.5 °F (36.9 °C)      Physical Exam:    LUNG: clear to auscultation bilaterally, HEART: regular rate and rhythm, S1, S2 normal, no murmur. ABDOMEN: Obese, non-distended, soft; wounds dry and intact; appropriate incisional pain with palpation.     Data Review: VS, I/O's, Labs    Lab Review:   Recent Results (from the past 12 hour(s))   METABOLIC PANEL, BASIC    Collection Time: 18  3:11 AM   Result Value Ref Range    Sodium 141 136 - 145 mmol/L    Potassium 4.2 3.5 - 5.1 mmol/L    Chloride 107 97 - 108 mmol/L    CO2 27 21 - 32 mmol/L    Anion gap 7 5 - 15 mmol/L    Glucose 104 (H) 65 - 100 mg/dL    BUN 12 6 - 20 MG/DL    Creatinine 0.84 0.55 - 1.02 MG/DL    BUN/Creatinine ratio 14 12 - 20      GFR est AA >60 >60 ml/min/1.73m2    GFR est non-AA >60 >60 ml/min/1.73m2    Calcium 7.9 (L) 8.5 - 10.1 MG/DL   CBC WITH AUTOMATED DIFF    Collection Time: 18  3:11 AM   Result Value Ref Range    WBC 7.3 3.6 - 11.0 K/uL    RBC 3.72 (L) 3.80 - 5.20 M/uL    HGB 10.4 (L) 11.5 - 16.0 g/dL    HCT 32.8 (L) 35.0 - 47.0 %    MCV 88.2 80.0 - 99.0 FL    MCH 28.0 26.0 - 34.0 PG    MCHC 31.7 30.0 - 36.5 g/dL    RDW 16.3 (H) 11.5 - 14.5 %    PLATELET 343 844 - 646 K/uL    MPV 12.0 8.9 - 12.9 FL    NRBC 0.0 0  WBC    ABSOLUTE NRBC 0.00 0.00 - 0.01 K/uL    NEUTROPHILS 74 32 - 75 %    LYMPHOCYTES 18 12 - 49 %    MONOCYTES 7 5 - 13 %    EOSINOPHILS 1 0 - 7 %    BASOPHILS 1 0 - 1 %    IMMATURE GRANULOCYTES 0 0.0 - 0.5 %    ABS. NEUTROPHILS 5.4 1.8 - 8.0 K/UL    ABS. LYMPHOCYTES 1.3 0.8 - 3.5 K/UL    ABS. MONOCYTES 0.5 0.0 - 1.0 K/UL    ABS. EOSINOPHILS 0.0 0.0 - 0.4 K/UL    ABS. BASOPHILS 0.1 0.0 - 0.1 K/UL    ABS. IMM. GRANS. 0.0 0.00 - 0.04 K/UL    DF AUTOMATED           Assessment:     Hospital Problems  Date Reviewed: 2018          Codes Class Noted POA    * (Principal)[facility] ICD-10-CM: K56.1  ICD-9-CM: 560.0  2018 Yes        Periumbilical abdominal pain ICD-10-CM: R10.33  ICD-9-CM: 789.05  4/10/2018 Yes              Plan/Recommendations/Medical Decision Makin. Clear liquids. 2. Oral analgesics. 3. PTA medications. 4. Ambulation, spirometry.     Signed By: Lucinda Shah MD     2018 Expiration Date (Optional): 4/2024

## 2023-09-17 NOTE — ANESTHESIA POSTPROCEDURE EVALUATION
Post-Anesthesia Evaluation and Assessment    Patient: Fausto Soliz MRN: [id number]  SSN: [id number]    YOB: 1955  Age: 61 y.o. Sex: female       Cardiovascular Function/Vital Signs  Visit Vitals    /62    Pulse (!) 57    Temp 36.8 °C (98.2 °F)    Resp 11    Ht 5' 10\" (1.778 m)    Wt 129.4 kg (285 lb 6 oz)    SpO2 99%    BMI 40.95 kg/m2       Patient is status post general anesthesia for Procedure(s):  LAPAROSCOPIC SMALL BOWEL RESECTION. Nausea/Vomiting: None    Postoperative hydration reviewed and adequate. Pain:  Pain Scale 1: Numeric (0 - 10) (04/23/18 1404)  Pain Intensity 1: 0 (04/23/18 1404)   Managed    Neurological Status:   Neuro (WDL): Exceptions to WDL (drowsy) (04/23/18 1404)  Neuro  LUE Motor Response: Purposeful (04/23/18 1404)  LLE Motor Response: Purposeful (04/23/18 1404)  RUE Motor Response: Purposeful (04/23/18 1404)  RLE Motor Response: Purposeful (04/23/18 1404)   At baseline    Mental Status and Level of Consciousness: Arousable    Pulmonary Status:   O2 Device: Nasal cannula (04/23/18 1404)   Adequate oxygenation and airway patent    Complications related to anesthesia: None    Post-anesthesia assessment completed.  No concerns    Signed By: Ramesh Gayle MD     April 23, 2018 Warfarin/Coumadin increases your risk for bleeding. Notify your doctor if you see any bleeding or any of the side effects listed in the Warfarin/Coumadin Booklet. Diet and medications can affect the PT/INR blood level. When Warfarin/Coumadin is taken with other medicines it can change the way other medicines work. Other medicines can also change the way Warfarin/Coumadin works. It is very important to tell your health care provider about all other medicines, including over-the-counter medications, herbs, diet supplements, or products containing vitamin K. Call your doctor before starting, stopping, or changing the dose of any prescription or over-the-counter medications. Any product containing aspirin lessens the blood's ability to form clots and adds to the effect of Warfarin/Coumadin. Never take aspirin without speaking with your health care provider.

## 2024-04-20 NOTE — PROGRESS NOTES
Inserted under fluoro. Subjective:      Melina Schultz is a 61 y.o. female presents for postop care following LRYGBP (dr. Jarek Nichols). Appetite is fair. Eating a regular diet with early satiety and bloating; this has been present x 3 weeks. Bowel movements are regular. Pain is audie-umbilical without radiation. She notes nausea but denies emesis, fever or chills. .    Objective:     Visit Vitals    /64    Pulse 62    Temp 97.9 °F (36.6 °C)    Resp 20    Ht 5' 9.5\" (1.765 m)    Wt 289 lb 8 oz (131.3 kg)    SpO2 98%    BMI 42.14 kg/m2       General:  alert, cooperative, no distress, appears stated age, morbidly obese   Abdomen: soft, non-tender, no hernias, distended, tympanitic; audie-umbilical pain with palpation   Incision:   well healed     Assessment:     Abdominal pain, bloating following remote gastric bypass. Concern for internal hernia as source of symptoms. Plan:     1. Continue current medications. 2. Diet as tolerated. .  3. Will schedule for abdomen/pelvis CT.

## (undated) DEVICE — TROCAR SITE CLOSURE DEVICE: Brand: ENDO CLOSE

## (undated) DEVICE — SUTURE MCRYL SZ 4-0 L27IN ABSRB UD L19MM PS-2 1/2 CIR PRIM Y426H

## (undated) DEVICE — 3000CC GUARDIAN II: Brand: GUARDIAN

## (undated) DEVICE — UNIVERSAL FIXATION CANNULA: Brand: VERSAPORT

## (undated) DEVICE — DEVON™ KNEE AND BODY STRAP 60" X 3" (1.5 M X 7.6 CM): Brand: DEVON

## (undated) DEVICE — SOLUTION IRRIGATION NACL 0.9% 1000 ML FLX CONTAINER

## (undated) DEVICE — INSUFFLATION NEEDLE: Brand: SURGINEEDLE

## (undated) DEVICE — SUTURE VCRL SZ 1 L27IN ABSRB VLT L36MM CT-1 1/2 CIR J341H

## (undated) DEVICE — (D)PREP SKN CHLRAPRP APPL 26ML -- CONVERT TO ITEM 371833

## (undated) DEVICE — SURGICAL PROCEDURE KIT GEN LAPAROSCOPY LF

## (undated) DEVICE — STERILE POLYISOPRENE POWDER-FREE SURGICAL GLOVES WITH EMOLLIENT COATING: Brand: PROTEXIS

## (undated) DEVICE — DEVICE SUT VLT PRELD W/ POLYSRB 2-0 L48IN SUT DISP FOR LAP

## (undated) DEVICE — SHEARS ENDOSCP L36CM DIA5MM ULTRASONIC CRV TIP W/ ADV

## (undated) DEVICE — POWER SHELL: Brand: SIGNIA

## (undated) DEVICE — BLADELESS OPTICAL TROCAR WITH FIXATION CANNULA: Brand: VERSAPORT

## (undated) DEVICE — FILTER SMK EVAC FLO CLR MEGADYNE

## (undated) DEVICE — TUBING INSUFLTN 10FT LUER -- CONVERT TO ITEM 368568

## (undated) DEVICE — SUTURING DEVICE: Brand: ENDO STITCH

## (undated) DEVICE — BLADELESS OPTICAL TROCAR WITH FIXATION CANNULA: Brand: VERSAONE

## (undated) DEVICE — NEEDLE HYPO 22GA L1.5IN BLK S STL HUB POLYPR SHLD REG BVL

## (undated) DEVICE — SUTURE SZ 0 27IN 5/8 CIR UR-6  TAPER PT VIOLET ABSRB VICRYL J603H

## (undated) DEVICE — ARTICULATING RELOAD WITH TRI-STAPLE TECHNOLOGY: Brand: ENDO GIA

## (undated) DEVICE — DEVICE SUT 0 L48IN GRN POLY BRAID LD UNIT DISP SURGDAC

## (undated) DEVICE — HANDLE LT SNAP ON ULT DURABLE LENS FOR TRUMPF ALC DISPOSABLE

## (undated) DEVICE — UNIVERSAL FIXATION CANNULA: Brand: VERSAONE

## (undated) DEVICE — Device

## (undated) DEVICE — CLICKLINE SCISSORS INSERT: Brand: CLICKLINE

## (undated) DEVICE — REM POLYHESIVE ADULT PATIENT RETURN ELECTRODE: Brand: VALLEYLAB

## (undated) DEVICE — COLON CLOSING PACK: Brand: MEDLINE INDUSTRIES, INC.

## (undated) DEVICE — BAG SPEC RETRV 275ML 10ML DISPOSABLE RELIACATCH

## (undated) DEVICE — STERILE-Z MAYO STAND COVERS CLEAR POLYETHYLENE STERILE UNIVERSAL FIT 20 PER CASE: Brand: STERILE-Z

## (undated) DEVICE — KENDALL SCD EXPRESS SLEEVES, KNEE LENGTH, MEDIUM: Brand: KENDALL SCD

## (undated) DEVICE — INFECTION CONTROL KIT SYS